# Patient Record
Sex: MALE | NOT HISPANIC OR LATINO | Employment: FULL TIME | URBAN - METROPOLITAN AREA
[De-identification: names, ages, dates, MRNs, and addresses within clinical notes are randomized per-mention and may not be internally consistent; named-entity substitution may affect disease eponyms.]

---

## 2023-06-05 ENCOUNTER — TRANSFERRED RECORDS (OUTPATIENT)
Dept: HEALTH INFORMATION MANAGEMENT | Facility: CLINIC | Age: 53
End: 2023-06-05

## 2023-06-05 ENCOUNTER — MEDICAL CORRESPONDENCE (OUTPATIENT)
Dept: HEALTH INFORMATION MANAGEMENT | Facility: CLINIC | Age: 53
End: 2023-06-05
Payer: COMMERCIAL

## 2023-06-05 LAB — HBA1C MFR BLD: 6.1 % (ref 4.8–5.6)

## 2023-06-20 NOTE — PROGRESS NOTES
Medication Therapy Management (MTM) Encounter    ASSESSMENT:                            Medication Adherence/Access: No issues identified    Type 2 Diabetes:    A1c at goal of less than 7.  Blood sugars at goal of less than 180.  Patient would like to pursue GLP-1 therapy due to comorbid overweight.  Appropriate use and potential side effects discussed.  We will start Mounjaro if covered by insurance and available via . Patient unsure whether or not he wants to start CGM, would like to prioritize weight loss, we will discuss in future visits.    Hypertension:   Stable.  Blood pressure goal less than 140/90    Hyperlipidemia:   Stable.    ED: Stable.    Other supplements:   Stable.    PLAN:                            1. I will attempt to send Mounjaro to [facility].     2. Let me know if you decide you'd rather do CGM monitoring.    Follow-up: Return in about 4 weeks (around 2023) for phone visit.    SUBJECTIVE/OBJECTIVE:                          Billy Chavez is a 53 year old male coming in for an initial visit. He was referred to me from Dr. Tong.    Reason for visit: Referred for GLP-1 discussion.    Allergies/ADRs: Reviewed in chart  Past Medical History: Reviewed in chart  Tobacco: He has no history on file for tobacco use.  Alcohol: not currently using    Medication Adherence/Access: no issues reported    Type 2 Diabetes:    Metformin XR 1500 mg daily    Patient is experiencing the following side effects: none  Aspirin: Not taking due to not indicated  Blood sugar monitorin-3 time(s) daily. Ranges (patient reported): Fasting- 120s  Post-Prandial- 100-140s  Current diabetes symptoms: none  Diet/Exercise: Feels hungry most of the time, like he's constantly fighting the feeling.  A1c last checked on 2023 was 6.1.    Hypertension:   Amlodipine 5 mg daily  Lisinopril/hydrochlorothiazide 20 mg / 12.5 mg daily    Patient does not self-monitor blood pressure.  Patient reports no current  medication side effects.  BP Readings from Last 3 Encounters:   06/21/23 126/84     Pulse Readings from Last 3 Encounters:   No data found for Pulse     Hyperlipidemia:   rosuvastatin 20mg daily    Patient reports no significant myalgias or other side effects.    ED: Occasional use of sildenafil 20 mg to 60 mg daily without complaint of issues.    Other supplements:   Multivitamin a few times a week  Vitamin D 2000 international units daily    No complaint of issues.  Patient likes to take a multivitamin for iodine supplementation.    Today's Vitals: /84   Wt 265 lb (120.2 kg)   ----------------    I spent 45 minutes with this patient today. All changes were made via collaborative practice agreement with Dr. Tong. A copy of the visit note was provided to the patient's provider(s).    A summary of these recommendations was given to the patient.    Shantell Thomas, CyrilD, BCACP  Medication Therapy Management Provider  Phone: [phone]  [email or web address]     Medication Therapy Recommendations  No medication therapy recommendations to display

## 2023-06-21 ENCOUNTER — OFFICE VISIT (OUTPATIENT)
Dept: PHARMACY | Facility: PHYSICIAN GROUP | Age: 53
End: 2023-06-21
Payer: COMMERCIAL

## 2023-06-21 VITALS — DIASTOLIC BLOOD PRESSURE: 84 MMHG | SYSTOLIC BLOOD PRESSURE: 126 MMHG | WEIGHT: 265 LBS

## 2023-06-21 DIAGNOSIS — E11.9 TYPE 2 DIABETES MELLITUS WITHOUT COMPLICATION, WITHOUT LONG-TERM CURRENT USE OF INSULIN (H): Primary | ICD-10-CM

## 2023-06-21 DIAGNOSIS — Z78.9 TAKES DIETARY SUPPLEMENTS: ICD-10-CM

## 2023-06-21 DIAGNOSIS — E78.2 MIXED HYPERLIPIDEMIA: ICD-10-CM

## 2023-06-21 DIAGNOSIS — N52.9 ERECTILE DYSFUNCTION, UNSPECIFIED ERECTILE DYSFUNCTION TYPE: ICD-10-CM

## 2023-06-21 DIAGNOSIS — I10 HTN (HYPERTENSION), BENIGN: ICD-10-CM

## 2023-06-21 PROCEDURE — 99605 MTMS BY PHARM NP 15 MIN: CPT | Performed by: PHARMACIST

## 2023-06-21 PROCEDURE — 99607 MTMS BY PHARM ADDL 15 MIN: CPT | Performed by: PHARMACIST

## 2023-06-21 RX ORDER — OMEGA-3 FATTY ACIDS/FISH OIL 300-1000MG
2000 CAPSULE ORAL DAILY
COMMUNITY
End: 2023-06-21

## 2023-06-21 RX ORDER — IBUPROFEN 200 MG
CAPSULE ORAL DAILY
COMMUNITY
End: 2023-06-21

## 2023-06-21 RX ORDER — METFORMIN HCL 500 MG
500 TABLET, EXTENDED RELEASE 24 HR ORAL
COMMUNITY

## 2023-06-21 RX ORDER — SILDENAFIL CITRATE 20 MG/1
20 TABLET ORAL 3 TIMES DAILY
COMMUNITY

## 2023-06-21 RX ORDER — ROSUVASTATIN CALCIUM 20 MG/1
20 TABLET, COATED ORAL DAILY
COMMUNITY

## 2023-06-21 RX ORDER — CHOLECALCIFEROL (VITAMIN D3) 50 MCG
1 TABLET ORAL DAILY
COMMUNITY

## 2023-06-21 RX ORDER — AMLODIPINE BESYLATE 5 MG/1
5 TABLET ORAL DAILY
COMMUNITY

## 2023-06-21 RX ORDER — TIRZEPATIDE 2.5 MG/.5ML
2.5 INJECTION, SOLUTION SUBCUTANEOUS
Qty: 2 ML | Refills: 0 | Status: CANCELLED | OUTPATIENT
Start: 2023-06-21

## 2023-06-21 NOTE — PATIENT INSTRUCTIONS
"Recommendations from today's MTM visit:                                                       1. I will attempt to send Mounjaro to [facility] [facility].     2. Let me know if you decide you'd rather do CGM monitoring.    Follow-up: Return in about 4 weeks (around 7/19/2023) for phone visit.    It was great speaking with you today.  I value your experience and would be very thankful for your time in providing feedback in our clinic survey. In the next few days, you may receive an email or text message from [employer] with a link to a survey related to your  clinical pharmacist.\"     To schedule another MTM appointment, please call the clinic directly or you may call the MTM scheduling line at [phone] or toll-free at [phone].     My Clinical Pharmacist's contact information:                                                      Please feel free to contact me with any questions or concerns you have.      Shantell Thomas, [place], [facility]  Medication Therapy Management Provider  Phone: [phone]  [email or web address]    "

## 2023-08-17 RX ORDER — TIRZEPATIDE 2.5 MG/.5ML
2.5 INJECTION, SOLUTION SUBCUTANEOUS
COMMUNITY
End: 2023-08-22 | Stop reason: DRUGHIGH

## 2023-08-17 NOTE — PROGRESS NOTES
Medication Therapy Management (MTM) Encounter    ASSESSMENT:                            Medication Adherence/Access: No issues identified    Type 2 Diabetes:   Patient is meeting A1c goal of < 7%. Self monitoring of blood glucose is at goal of fasting  mg/dL.  Patient is tolerating Mounjaro 2.5 mg weekly appropriately. Recommend increasing dose to help continue to control blood sugars and help with weight loss.    Hypertension:   Stable. Patient is meeting blood pressure goal of < 140/90mmHg.    Hyperlipidemia:   Stable.  Patient is on high intensity statin which is indicated based on 2019 ACC/AHA guidelines for lipid management.        PLAN:                            Increase Mounjaro to 5 mg a week      Follow-up: Return in about 6 weeks (around 10/3/2023) for Medication Therapy Management.    SUBJECTIVE/OBJECTIVE:                          Billy Chavez is a 53 year old male called for an initial visit. He was referred to me from Min Tong MD.      Reason for visit: med review, mounjaro follow up.    Allergies/ADRs: Reviewed in chart  Past Medical History: Reviewed in chart  Tobacco: He reports that he has quit smoking. His smoking use included cigarettes. He has never used smokeless tobacco.  Alcohol: not currently using      Medication Adherence/Access: no issues reported    Type 2 Diabetes:    Mounjaro 2.5 mg weekly  Metformin XR 1500 mg daily    Patient is not experiencing side effects. Reports he may have had some slight stomach upset but nothing to be concerned about.  Doesn't have a scale yet, so isn't sure about weight loss.  Does report a reduced appetite.  Blood sugar monitoring: fasting time(s) daily; Ranges: (patient reported) this morning was 94 mg/dL, it has been staying in the 90's.   Current diabetes symptoms: none  Diet/Exercise: Does report a reduced appetite.          Hypertension:   Amlodipine 5 mg daily  Lisinopril-hydrochlorothiazide 20-12.5 mg daily  Patient reports no current  medication side effects.  Patient does not self-monitor blood pressure.    BP Readings from Last 3 Encounters:   06/21/23 126/84       Hyperlipidemia:   rosuvastatin 20 mg daily  Patient reports no significant myalgias or other side effects.          Today's Vitals: There were no vitals taken for this visit.  ----------------      I spent 40 minutes with this patient today. All changes were made via collaborative practice agreement with Min Tong MD. A copy of the visit note was provided to the patient's provider(s).    A summary of these recommendations was declined by the patient.    Zoila Soares, PharmD  Medication Therapy Management Pharmacist  Voicemail: [phone]      Telemedicine Visit Details  Type of service:  Telephone visit  Start Time:  10:00 AM  End Time: 10:10 AM     Medication Therapy Recommendations  Type 2 diabetes mellitus without complication, without long-term current use of insulin (H)    Current Medication: tirzepatide (MOUNJARO) 2.5 MG/0.5ML pen (Discontinued)   Rationale: Dose too low - Dosage too low - Effectiveness   Recommendation: Increase Dose   Status: Accepted per CPA

## 2023-08-22 ENCOUNTER — VIRTUAL VISIT (OUTPATIENT)
Dept: PHARMACY | Facility: PHYSICIAN GROUP | Age: 53
End: 2023-08-22
Payer: COMMERCIAL

## 2023-08-22 DIAGNOSIS — E11.9 TYPE 2 DIABETES MELLITUS WITHOUT COMPLICATION, WITHOUT LONG-TERM CURRENT USE OF INSULIN (H): Primary | ICD-10-CM

## 2023-08-22 DIAGNOSIS — I10 HTN (HYPERTENSION), BENIGN: ICD-10-CM

## 2023-08-22 DIAGNOSIS — E78.2 MIXED HYPERLIPIDEMIA: ICD-10-CM

## 2023-08-22 PROCEDURE — 99606 MTMS BY PHARM EST 15 MIN: CPT | Performed by: PHARMACIST

## 2023-08-22 PROCEDURE — 99607 MTMS BY PHARM ADDL 15 MIN: CPT | Performed by: PHARMACIST

## 2023-08-22 RX ORDER — TIRZEPATIDE 5 MG/.5ML
5 INJECTION, SOLUTION SUBCUTANEOUS
COMMUNITY
End: 2023-10-03

## 2023-08-22 NOTE — PATIENT INSTRUCTIONS
"Recommendations from today's MTM visit:                                                       Increase Mounjaro to 5 mg a week    Follow-up: Return in about 6 weeks (around 10/3/2023) for Medication Therapy Management.    It was great speaking with you today.  I value your experience and would be very thankful for your time in providing feedback in our clinic survey. In the next few days, you may receive an email or text message from [facility] [employer] with a link to a survey related to your  clinical pharmacist.\"     To schedule another MTM appointment, please call the clinic directly or you may call the MTM scheduling line at [phone] or toll-free at [phone].     My Clinical Pharmacist's contact information:                                                      Please feel free to contact me with any questions or concerns you have.      Zoila Soares, PharmD  Medication Therapy Management Pharmacist  Voicemail: [phone]     "

## 2023-09-05 ENCOUNTER — HOSPITAL ENCOUNTER (OUTPATIENT)
Dept: NUTRITION | Facility: CLINIC | Age: 53
Discharge: HOME | End: 2023-09-05
Attending: FAMILY MEDICINE | Admitting: FAMILY MEDICINE
Payer: COMMERCIAL

## 2023-09-05 PROCEDURE — 97802 MEDICAL NUTRITION INDIV IN: CPT | Mod: 95 | Performed by: DIETITIAN, REGISTERED

## 2023-09-05 NOTE — PROGRESS NOTES
"[place] [facility]  Medical Nutrition Therapy    Visit Type: Initial Assessment    Billy Chavze, 53 year old referred by Min Tong for MNT related to Hyperlipidemia, Hypertension, Obesity, and Diabetes      Virtual Visit Details    Type of service:  Telephone Visit   Phone call duration: 65 minutes      Nutrition Assessment:  Anthropometrics  Height: 5' 10\" (patient report)        BMI:  38.4   Weight Status:  Obesity Grade II BMI 35-39.9   Weight:   Wt Readings from Last 1 Encounters:   06/21/23 120.2 kg (265 lb)       UBW: Not sure      IBW:  75 kg (166 lb) IBW %: 160%        Weight History:   Wt Readings from Last 10 Encounters:   06/21/23 120.2 kg (265 lb)   -Doesn't have a scale at home. Not sure what his weight is currently.   -Feels like weight has been slowly increasing over the years.     Goal Weight:   -Not specified    Nutrition History    PMH:   Diabetes Mellitus Type II  HTN  Hyperlipidemia    -Visited with a dietitian a few years ago, when he was dx with diabetes. Found these visits to be helpful. Information learned was mostly related to diabetes.   -Doesn't count carbs, but eats low carbs.   -BG has been around 100 mg/dL, in the PM BG is usually around 100-130 mg/dL.     Labs: reviewed  6/5/23: Hgb A1c: 6.1%      Meds: reviewed  Mounjaro 2.5 mg weekly  Metformin XR 1500 mg daily  Amlodipine 5 mg daily  Lisinopril-hydrochlorothiazide 20-12.5 mg daily  Rosuvastatin 20 mg daily     Supplements: reviewed      Social/Environmental:   -average sleep per night: not discussed  -level of stress: not discussed      Food Record  Breakfast: 9:00 am: Turkey breast, pepperoni, onion, cheese, cherry tomatoes, Chobani yogurt drink, 1% milk, coffee w/half and half, blueberries   Lunch: [place] restaurant - beef, veggies, white rice OR sweet kale salad, canned chicken breast, apple  Dinner: Ground bison, onions, cauliflower, avocado oil, sourdough bread OR salmon steak, 2 pieces of crusted tilapia, 1 " cup Nigerien mac n' cheese, 2 bowls of mediterranean crunch salad  Snacks: 2 small apples, stuffed olives with cheddar cheese, veggies with ranch, butterscotch candy OR Chobani yogurt drink   Beverages: Water, Coke Zero, sometimes instant coffee, trying to reduce caffeine   -Take-out - once per week - goes to all different restaurants - [employer] (1 big burriTangerine Power - the largest one they offer), OR a big burger from [employer], [employer] [place], [employer], [place] [employer]  -Works from home     Nutritional Details:   -Food allergies: none  -Food intolerances: none  -Food sensitivities: none  -GI concerns:  none  -Appetite: good  -Pace of eating: tries not to eat too fast, sometimes in a hurry, mostly moderate speed  -Role of cooking: self  -Role of food shopping: self      Physical Activity:  -Doesn't exercise. Stopped his club membership due to COVID.   -Has been biking and walking around the [address] [place] [address] for an hour.   -Weather is too hot for him.   -Bought a canoe rack outside with his son over the weekend.      ASSESSED MALNUTRITION STATUS  % Weight Loss:  None noted  % Intake:  No decreased intake noted  Subcutaneous Fat Loss:  Unable to determine  Loss of Muscle Mass:  Unable to determine  Fluid Retention:  None noted    Malnutrition Diagnosis:  Patient does not meet two of the above criteria necessary for diagnosing malnutrition      Nutrition Diagnosis:  Excessive energy intake related to food and nutrition knowledge deficit as evidenced by usual dietary intake exceeding energy needs by estimated 200%, BMI 38.4, dx diabetes mellitus type II, and report of not knowing nutrition recommendations for weight loss.         Nutrition Intervention:  Nutrition Prescription Summary: MNT for Hyperlipidemia, HTN, and obesity      Nutrition Education (Content):  -Educated pt on using MyPlate for meal planning and discussed portion sizes   -Discussed recommended and not recommended foods (choosing WGs, lean meats, low-fat dairy, fresh  fruits & veggies, unsat fats, and limiting high-sodium and refined sugar foods)  -Educated pt on metabolism and used the fire analogy; talked about the importance of consuming consistent meals/snacks throughout the day   -Educated patient on carb counting  -Discussed healthy snack options- protein+complex CHO  -Educated pt on reading food labels  -Discussed ways to make healthy choices when dining out (choosing baked, broiled, or grilled, unbreaded meats w/o sauces/gravies or choosing them on the side to control amount used)  -Encouraged pt to drink more water throughout the day and explained the importance of hydration.   -Encouraged pt to increase daily PA- include cardiovascular activities w/ultimate goal of 60 min per day     Emailed/mailed information discussed including nutrition handouts to patient.       Nutrition Prescription: Macronutrient and Micronutrient details   Dosing weight: Current wt (75 kg)  Energy: 4383-2795 kcals/day (Cecilia St Jeor)    Justification:  (obesity, to promote a 2 lb wt loss per week)   Protein: 75-90 g Pro/day (1-1.2 g pro/Kg)    Justification:  (obesity guidelines )   Fluid: 7893-5837 mL/day   (1 mL/Kcal)     Justification:  (obese)   Fiber:     Men (>50 years): 30 grams per day         Carbohydrate: 45-65% kcal   <25 g added sugar/day  2-3 CHO choices per meal and 1-2 CHO choices per snack        Fat: 20-35% kcal  <7% kcal from saturated fat   Micronutrient: DRI  <2,300 mg sodium/day            Vitamin and Mineral Supplements: n/a       Patient Engagement:   Assessed learning needs and learning preference: Yes.  Teaching Method(s) used: Explanation    Nutrition Education (Application):  a)  Discussed current eating plans / recommended alternative food choices    b)  Patient verbalizes understanding of diet by asking questions     Anticipate >50% compliance   Stage of Change:  preparation      Nutrition Goals:  1) Track calorie intake using My Fitness Pal and aim for 5014-9944  calories per day   2) Increase physical activity - aim for 30 min of exercise on weekdays and 60 min on weekends  3) Keep carbs at 30-45 g per meal and 15-30 g per snack     Nutrition Follow Up / Monitoring:   Weight, PO intake, PA, labs (A1c, BG, lipid panel)      Nutrition Recommendations:  Patient to follow-up with RD in 8 weeks.  Patient has RD contact information to call/email if needed.      Start time: 8:45  End time: 9:50    Total Time Duration: 65 min      Laurel Swain MS, RD, LD, [facility]  Clinical Dietitian  [phone]

## 2023-10-02 NOTE — PROGRESS NOTES
Medication Therapy Management (MTM) Encounter    ASSESSMENT:                            Medication Adherence/Access: No issues identified    Type 2 Diabetes:   Patient is meeting A1c goal of < 7%. Self monitoring of blood glucose is at goal of fasting  mg/dL.  Patient is tolerating Mounjaro 5 mg weekly appropriately. Recommend increasing dose to help continue to control blood sugars and help with weight loss.  Patient will reach out if he starts to have lows. Did discuss lows are rare on current regimen, but could reduce metformin dose if needed.     Hypertension:   Stable. Patient is meeting blood pressure goal of < 140/90mmHg.     Hyperlipidemia:   Stable.  Patient is on high intensity statin which is indicated based on 2019 ACC/AHA guidelines for lipid management.      PLAN:                            Increase Mounjaro to 7.5 mg a week    Follow-up: Return in about 3 weeks (around 10/24/2023) for Medication Therapy Management, In-Clinic Visit.    SUBJECTIVE/OBJECTIVE:                          Billy Chavez is a 53 year old male called for a follow-up visit from 8/22/23.       Reason for visit: Mounjaro follow up.    Allergies/ADRs: Reviewed in chart  Past Medical History: Reviewed in chart  Tobacco: He reports that he has quit smoking. His smoking use included cigarettes. He has never used smokeless tobacco.  Alcohol: not currently using    Medication Adherence/Access: no issues reported    Diabetes   Type 2 Diabetes:    Mounjaro 5 mg weekly  Metformin XR 1500 mg daily     Reports he is having less frequent bowel movements, but wouldn't call it constipation. Reports it is tolerable, just less frequent stool and larger amounts at once.  Doesn't have a scale yet, so isn't sure about weight loss. Would like to come in in person next visit to check weight. Interested in continuing to increase Mounjaro to help with additional weight loss.  Does report a reduced appetite. Reports that he is eating a bit  less.  Blood sugar monitoring: fasting time(s) daily; Ranges: (patient reported) last night was 111 mg/dL, 125 mg/dL yesterday morning. Reports most of them are in the lower 100's, but can dip into the low 90's or upper 80's. Nothing less than 80. Reports sometimes when he is in the low 80's he may get headaches or lethargy.  Current diabetes symptoms: none  Diet/Exercise: Patient reports that he has been exercising more lately.                 Hypertension   Amlodipine 5 mg daily  Lisinopril-hydrochlorothiazide 20-12.5 mg daily  Patient reports no current medication side effects.  Patient does not self-monitor blood pressure.          BP Readings from Last 3 Encounters:   06/21/23 126/84       Hyperlipidemia   rosuvastatin 20 mg daily  Patient reports no significant myalgias or other side effects.                   Today's Vitals: There were no vitals taken for this visit.  ----------------      I spent 20 minutes with this patient today. All changes were made via collaborative practice agreement with Min Tong MD. A copy of the visit note was provided to the patient's provider(s).    A summary of these recommendations was declined by the patient.    Zoila Soares, PharmD  Medication Therapy Management Pharmacist  Voicemail: [phone]      Telemedicine Visit Details  Type of service:  Telephone visit  Start Time:  10:00 AM  End Time:  10:20 AM       Medication Therapy Recommendations  Type 2 diabetes mellitus without complication, without long-term current use of insulin (H)    Current Medication: tirzepatide (MOUNJARO) 5 MG/0.5ML pen (Discontinued)   Rationale: Dose too low - Dosage too low - Effectiveness   Recommendation: Increase Dose - Mounjaro 7.5 MG/0.5ML Sopn   Status: Accepted per CPA

## 2023-10-03 ENCOUNTER — VIRTUAL VISIT (OUTPATIENT)
Dept: PHARMACY | Facility: PHYSICIAN GROUP | Age: 53
End: 2023-10-03
Payer: COMMERCIAL

## 2023-10-03 DIAGNOSIS — E11.9 TYPE 2 DIABETES MELLITUS WITHOUT COMPLICATION, WITHOUT LONG-TERM CURRENT USE OF INSULIN (H): Primary | ICD-10-CM

## 2023-10-03 DIAGNOSIS — I10 HTN (HYPERTENSION), BENIGN: ICD-10-CM

## 2023-10-03 DIAGNOSIS — E78.2 MIXED HYPERLIPIDEMIA: ICD-10-CM

## 2023-10-03 PROCEDURE — 99607 MTMS BY PHARM ADDL 15 MIN: CPT | Performed by: PHARMACIST

## 2023-10-03 PROCEDURE — 99606 MTMS BY PHARM EST 15 MIN: CPT | Performed by: PHARMACIST

## 2023-10-03 RX ORDER — TIRZEPATIDE 7.5 MG/.5ML
7.5 INJECTION, SOLUTION SUBCUTANEOUS
COMMUNITY
End: 2023-10-24

## 2023-10-03 NOTE — PATIENT INSTRUCTIONS
"Recommendations from today's MTM visit:                                                      Increase Mounjaro to 7.5 mg a week    Follow-up: Return in about 3 weeks (around 10/24/2023) for Medication Therapy Management, In-Clinic Visit.    It was great speaking with you today.  I value your experience and would be very thankful for your time in providing feedback in our clinic survey. In the next few days, you may receive an email or text message from [facility] [employer] with a link to a survey related to your  clinical pharmacist.\"     To schedule another MTM appointment, please call the clinic directly or you may call the MTM scheduling line at [phone] or toll-free at [phone].     My Clinical Pharmacist's contact information:                                                      Please feel free to contact me with any questions or concerns you have.      Zoila Soares, PharmD  Medication Therapy Management Pharmacist  Voicemail: [phone]    "

## 2023-10-17 ENCOUNTER — HOSPITAL ENCOUNTER (OUTPATIENT)
Dept: NUTRITION | Facility: CLINIC | Age: 53
Discharge: HOME | End: 2023-10-17
Attending: FAMILY MEDICINE | Admitting: FAMILY MEDICINE
Payer: COMMERCIAL

## 2023-10-17 PROCEDURE — 97803 MED NUTRITION INDIV SUBSEQ: CPT | Mod: TEL,95 | Performed by: DIETITIAN, REGISTERED

## 2023-10-17 NOTE — PROGRESS NOTES
"[facility]  Medical Nutrition Therapy     Visit Type: Re-Assessment     Billy Chavez, 53 year old referred by Min Tong for MNT related to Hyperlipidemia, Hypertension, Obesity, and Diabetes        Virtual Visit Details     Type of service:  Telephone Visit   Phone call duration: 30 minutes       Nutrition Assessment:  Anthropometrics  Height: 5' 10\" (patient report)          BMI:  38.4   Weight Status:  Obesity Grade II BMI 35-39.9   Weight:       Wt Readings from Last 1 Encounters:   23 120.2 kg (265 lb)        UBW: Not sure      IBW:  75 kg (166 lb) IBW %: 160%         Weight History:       Wt Readings from Last 10 Encounters:   23 120.2 kg (265 lb)   -Hasn't checked his weight lately. Has a scale that requires an jamie to use it. Having problems with his phone. He may return the scale.        Goal Weight:   -Not specified     Nutrition History     PMH:   Diabetes Mellitus Type II  HTN  Hyperlipidemia     Last visit w/patient was on 23. Nutrition goals set at that time were the followin) Track calorie intake using My Fitness Pal and aim for 3047-9991 calories per day   -Having problems with his phone. Hasn't started the jamie yet.   2) Increase physical activity - aim for 30 min of exercise on weekdays and 60 min on weekends  -Patient has been exercising more. Goal was 7 days per week but it's too much. Has been doing a lot of biking. Has been hurting non-stop. At least one day per week he's been resting. Close to 6 days per week exercising. Hasn't joined a gym yet so he's been outside exercising. Likes to row.   3) Keep carbs at 30-45 g per meal and 15-30 g per snack   -Has been eating more fish, crab, swordfish, and tilapia, following the Mediterranean Style Diet. Has been keeping high-carb foods to one serving. Using MyPlate.  -Doesn't count carbs, but eats low carbs.     -BG has been around 100 mg/dL, in the PM BG is usually around mid 80s -130 mg/dL. Rylands been " increased and BG has decreased slightly overall.   -Son is 15 years old and patient plans to get a gym membership for his son and him so they can workout together. He's interested in weight lifting.      Labs: reviewed  6/5/23: Hgb A1c: 6.1%    Meds: reviewed  Mounjaro 7.5 mg weekly  Metformin  mg daily  Amlodipine 5 mg daily  Lisinopril-hydrochlorothiazide 20-12.5 mg daily  Rosuvastatin 20 mg daily      Supplements: reviewed        Social/Environmental:   -average sleep per night: not discussed  -level of stress: not discussed        Food Record  Breakfast: 9:00 am: Less hungry with Mounjaro, and has been skipping breakfast a few times. Turkey breast, pepperoni, onion, cheese, cherry tomatoes, Chobani yogurt drink, 1% milk, coffee w/half and half, blueberries.   Lunch: [employer] restaurant - beef, veggies, white rice OR sweet kale salad, canned chicken breast, apple  Dinner: Ground bison, onions, cauliflower, avocado oil, sourdough bread OR salmon steak, 2 pieces of crusted tilapia, 1 cup Serbian mac n' cheese, 2 bowls of mediterranean crunch salad  Snacks: 2 small apples, stuffed olives with cheddar cheese, veggies with ranch, butterscotch candy OR Chobani yogurt drink   Beverages: Water, Coke Zero, sometimes instant coffee, trying to reduce caffeine   -Take-out - once per week - goes to all different restaurants - [employer] (1 big burrito - the largest one they offer), OR a big burger from [employer], [employer], [employer], [employer] food  -Works from home      Nutritional Details:   -Food allergies: none  -Food intolerances: none  -Food sensitivities: none  -GI concerns:  none  -Appetite: good  -Pace of eating: tries not to eat too fast, sometimes in a hurry, mostly moderate speed  -Role of cooking: self  -Role of food shopping: self        Physical Activity:  Patient has been exercising more. Goal was 7 days per week but it's too much. Has been doing a lot of biking. Has been hurting non-stop. At least  one day per week he's been resting. Close to 6 days per week exercising. Hasn't joined a gym yet so he's been outside exercising. Likes to row.      ASSESSED MALNUTRITION STATUS  % Weight Loss:  None noted  % Intake:  No decreased intake noted  Subcutaneous Fat Loss:  Unable to determine  Loss of Muscle Mass:  Unable to determine  Fluid Retention:  None noted     Malnutrition Diagnosis:  Patient does not meet two of the above criteria necessary for diagnosing malnutrition        Nutrition Diagnosis:  Excessive energy intake related to food and nutrition knowledge deficit as evidenced by usual dietary intake exceeding energy needs by estimated 200%, BMI 38.4, dx diabetes mellitus type II, and report of not knowing nutrition recommendations for weight loss.           Nutrition Intervention:  Nutrition Prescription Summary: MNT for Hyperlipidemia, HTN, and obesity       Nutrition Education (Content):  -Discussed goals set at last visit and barriers towards reaching them.   -Discussed exercise and healthy benefits. Educated patient on the benefits of aerobic and anaerobic exercises.   -Educated patient on concerns with intermittent fasting. Encouraged him to eat consistent meals throughout the day, listening to hunger/fullness cues.     Emailed/mailed information discussed including nutrition handouts to patient.         Nutrition Prescription: Macronutrient and Micronutrient details   Dosing weight: Current wt (75 kg)  Energy: 3028-3640 kcals/day ([place] St Jeor)     Justification:  (obesity, to promote a 2 lb wt loss per week)    Protein: 75-90 g Pro/day (1-1.2 g pro/Kg)     Justification:  (obesity guidelines )    Fluid: 3195-0735 mL/day   (1 mL/Kcal)     Justification:  (obese)   Fiber:     Men (>50 years): 30 grams per day          Carbohydrate: 45-65% kcal   <25 g added sugar/day  2-3 CHO choices per meal and 1-2 CHO choices per snack        Fat: 20-35% kcal  <7% kcal from saturated fat   Micronutrient:  DRI  <2,300 mg sodium/day                Vitamin and Mineral Supplements: n/a        Patient Engagement:   Assessed learning needs and learning preference: Yes.  Teaching Method(s) used: Explanation     Nutrition Education (Application):  a)  Discussed current eating plans / recommended alternative food choices     b)  Patient verbalizes understanding of diet by asking questions      Anticipate >50% compliance   Stage of Change:  preparation        Nutrition Goals:  1) Track calorie intake using [employer] Pal and aim for 9646-5257 calories per day. Try the website instead, if unable to use the jmaie.  2) Gradually increase physical activity - aim for 30 min of exercise on weekdays and 60 min on weekends. Get a gym membership for the winter.  3) Keep carbs consistent throughout the day. Use the [employer] Plate model for meal planning.      Nutrition Follow Up / Monitoring:   Weight, PO intake, PA, labs (A1c, BG, lipid panel)        Nutrition Recommendations:  Patient to follow-up with RD in 8 weeks.  Patient has RD contact information to call/email if needed.        Start time: 9:15  End time: 9:45     Total Time Duration: 30 min        Laurel Swain MS, RD, LD, [facility]  Clinical Dietitian  [phone]

## 2023-10-23 NOTE — PROGRESS NOTES
Medication Therapy Management (MTM) Encounter    ASSESSMENT:                            Medication Adherence/Access: No issues identified. Will send a message to Dr. Tong regarding request for sildenafil.    Type 2 Diabetes:   Patient is meeting A1c goal of < 7%. Self monitoring of blood glucose is at goal of fasting  mg/dL.  Patient is tolerating Mounjaro 7.5 mg weekly appropriately,so recommend increasing dose to help continue to control blood sugars and help with weight loss.  Discussed it sounds like he is having an injection site reaction, discussed with patient that this is common with injections and we don't have to stop the medication unless he wants to. Patient was injecting into the muscle of his thigh, so did discuss moving to the fattier part or injecting in the stomach again where he was only having occasional itchiness at injection site but no redness. Discussed he could take an antihistamine, but patient declined.  Patient will reach out if he starts to have lows. Did discuss lows are rare on current regimen, but could reduce metformin dose if needed.     Hypertension:   Stable. Patient is meeting blood pressure goal of < 140/90mmHg.     Hyperlipidemia:   Stable.  Patient is on high intensity statin which is indicated based on 2019 ACC/AHA guidelines for lipid management.      PLAN:                            Increase Mounjaro to 10 mg weekly.  Go back to injecting into the stomach or if you are injecting into your legs inject into the fattier part of your leg not the muscle      Follow-up: Return in about 4 weeks (around 11/21/2023) for Medication Therapy Management, Phone Visit.    SUBJECTIVE/OBJECTIVE:                          Billy Chavez is a 53 year old male coming in for a follow-up visit from 10/3/23.       Reason for visit: Mounjaro Follow up.    Allergies/ADRs: Reviewed in chart  Past Medical History: Reviewed in chart  Tobacco: He reports that he has quit smoking. His smoking use  included cigarettes. He has never used smokeless tobacco.  Alcohol: not currently using    Medication Adherence/Access: no issues reported  Patient requested a refill of sildenafil. This has never been prescribed by Dr. Tong, prescribed by previous physician at a different clinic.    Diabetes Type 2 Diabetes:    Mounjaro 7.5 mg weekly  Metformin XR 1500 mg daily     Reports today that is having an allergic reaction to the injection. Reports that he had only been injecting in stomach and recently switched to the leg. Reports since injecting into the leg has had a red raised bump that is itchy. Has been injecting into the top of the leg. Reports it does go away but it takes several days. Did not have any redness or raised skin when injecting into the stomach, but it was slightly itchy.  Also, noticed more heartburn lately. Didn't realize that could be related. Reports that he likes spicy foods and thinks it may be related to these.  Reports that he has noticed reduced appetite. Some times has no interest in eating more than one meal a day.  Reports he is having less frequent bowel movements, but wouldn't call it constipation. Reports it is tolerable, just less frequent stool and larger amounts at once.    Blood sugar monitoring: fasting time(s) daily; Ranges: (patient reported) Reports sometimes when he is in the low 80's he may get headaches or lethargy. Doesn't seem to happen too frequently, but maybe if he doesn't eat anything all day.  Current diabetes symptoms: none  Diet/Exercise: Patient reports that he has been exercising more lately.            Wt Readings from Last 5 Encounters:   10/24/23 256 lb 9 oz (116.4 kg)   06/21/23 265 lb (120.2 kg)          Hypertension   Amlodipine 5 mg daily  Lisinopril-hydrochlorothiazide 20-12.5 mg daily  Patient reports no current medication side effects. Occasional lightheadedness when moving from laying on the couch to standing. No concerns with falling and doesn't happen  very often.  Patient does not self-monitor blood pressure.          BP Readings from Last 3 Encounters:   10/24/23 128/80   06/21/23 126/84     Pulse Readings from Last 3 Encounters:   10/24/23 79     Hyperlipidemia   rosuvastatin 20 mg daily  Patient reports no significant myalgias or other side effects.                   Today's Vitals: /80   Pulse 79   Wt 256 lb 9 oz (116.4 kg)   ----------------      I spent 40 minutes with this patient today. All changes were made via collaborative practice agreement with Min Tong MD. A copy of the visit note was provided to the patient's provider(s).    A summary of these recommendations was given to the patient.    Zoila Soares, PharmD  Medication Therapy Management Pharmacist  Voicemail: [phone]       Medication Therapy Recommendations  Type 2 diabetes mellitus without complication, without long-term current use of insulin (H)    Current Medication: tirzepatide (MOUNJARO) 7.5 MG/0.5ML pen (Discontinued)   Rationale: Dose too low - Dosage too low - Effectiveness   Recommendation: Increase Dose   Status: Accepted per CPA          Current Medication: tirzepatide (MOUNJARO) 7.5 MG/0.5ML pen (Discontinued)   Rationale: Undesirable effect - Adverse medication event - Safety   Recommendation: Provide Education   Status: Patient Agreed - Adherence/Education

## 2023-10-24 ENCOUNTER — OFFICE VISIT (OUTPATIENT)
Dept: PHARMACY | Facility: PHYSICIAN GROUP | Age: 53
End: 2023-10-24
Payer: COMMERCIAL

## 2023-10-24 VITALS — DIASTOLIC BLOOD PRESSURE: 80 MMHG | WEIGHT: 256.56 LBS | HEART RATE: 79 BPM | SYSTOLIC BLOOD PRESSURE: 128 MMHG

## 2023-10-24 DIAGNOSIS — I10 HTN (HYPERTENSION), BENIGN: ICD-10-CM

## 2023-10-24 DIAGNOSIS — E78.2 MIXED HYPERLIPIDEMIA: ICD-10-CM

## 2023-10-24 DIAGNOSIS — E11.9 TYPE 2 DIABETES MELLITUS WITHOUT COMPLICATION, WITHOUT LONG-TERM CURRENT USE OF INSULIN (H): Primary | ICD-10-CM

## 2023-10-24 PROCEDURE — 99607 MTMS BY PHARM ADDL 15 MIN: CPT | Performed by: PHARMACIST

## 2023-10-24 PROCEDURE — 99606 MTMS BY PHARM EST 15 MIN: CPT | Performed by: PHARMACIST

## 2023-10-24 RX ORDER — TIRZEPATIDE 10 MG/.5ML
10 INJECTION, SOLUTION SUBCUTANEOUS
COMMUNITY
End: 2023-11-21

## 2023-10-24 NOTE — PATIENT INSTRUCTIONS
"Recommendations from today's MTM visit:                                                       Increase Mounjaro to 10 mg weekly.  Go back to injecting into the stomach or if you are injecting into your legs inject into the fattier part of your leg not the muscle  I will send a message to Dr. Tong regarding a refill of sildenafil    Follow-up: Return in about 4 weeks (around 11/21/2023) for Medication Therapy Management, Phone Visit.    It was great speaking with you today.  I value your experience and would be very thankful for your time in providing feedback in our clinic survey. In the next few days, you may receive an email or text message from [facility] with a link to a survey related to your  clinical pharmacist.\"     To schedule another MTM appointment, please call the clinic directly or you may call the MTM scheduling line at [phone] or toll-free at [phone].     My Clinical Pharmacist's contact information:                                                      Please feel free to contact me with any questions or concerns you have.      Zoila Soares, PharmD  Medication Therapy Management Pharmacist  Voicemail: [phone]    "

## 2023-11-21 ENCOUNTER — VIRTUAL VISIT (OUTPATIENT)
Dept: PHARMACY | Facility: PHYSICIAN GROUP | Age: 53
End: 2023-11-21
Payer: COMMERCIAL

## 2023-11-21 DIAGNOSIS — E11.9 TYPE 2 DIABETES MELLITUS WITHOUT COMPLICATION, WITHOUT LONG-TERM CURRENT USE OF INSULIN (H): Primary | ICD-10-CM

## 2023-11-21 DIAGNOSIS — E78.2 MIXED HYPERLIPIDEMIA: ICD-10-CM

## 2023-11-21 DIAGNOSIS — I10 HTN (HYPERTENSION), BENIGN: ICD-10-CM

## 2023-11-21 PROCEDURE — 99607 MTMS BY PHARM ADDL 15 MIN: CPT | Performed by: PHARMACIST

## 2023-11-21 PROCEDURE — 99606 MTMS BY PHARM EST 15 MIN: CPT | Performed by: PHARMACIST

## 2023-11-21 RX ORDER — TIRZEPATIDE 12.5 MG/.5ML
12.5 INJECTION, SOLUTION SUBCUTANEOUS
COMMUNITY
End: 2023-12-19

## 2023-11-21 NOTE — PATIENT INSTRUCTIONS
"Recommendations from today's MTM visit:                                                       Increase Mounjaro to 12.5 mg a week    Follow-up: Return in about 4 weeks (around 12/19/2023) for Medication Therapy Management, Phone Visit.    It was great speaking with you today.  I value your experience and would be very thankful for your time in providing feedback in our clinic survey. In the next few days, you may receive an email or text message from [facility] with a link to a survey related to your  clinical pharmacist.\"     To schedule another MTM appointment, please call the clinic directly or you may call the MTM scheduling line at [phone] or toll-free at [phone].     My Clinical Pharmacist's contact information:                                                      Please feel free to contact me with any questions or concerns you have.      Zoila Soares, PharmD  Medication Therapy Management Pharmacist  Voicemail: [phone]    "

## 2023-12-12 ENCOUNTER — HOSPITAL ENCOUNTER (OUTPATIENT)
Dept: NUTRITION | Facility: CLINIC | Age: 53
Discharge: HOME | End: 2023-12-12
Payer: COMMERCIAL

## 2023-12-12 PROCEDURE — 97803 MED NUTRITION INDIV SUBSEQ: CPT | Mod: TEL,95 | Performed by: DIETITIAN, REGISTERED

## 2023-12-12 NOTE — PROGRESS NOTES
"[facility]  Medical Nutrition Therapy     Visit Type: Re-Assessment     Billy Chavez, 53 year old referred by Min Tong for MNT related to Hyperlipidemia, Hypertension, Obesity, and Diabetes        Virtual Visit Details     Type of service:  Telephone Visit   Phone call duration: 42 minutes       Nutrition Assessment:  Anthropometrics  Height: 5' 10\" (patient report)          BMI:  36.8   Weight Status:  Obesity Grade II BMI 35-39.9   Weight:         Wt Readings from Last 1 Encounters:   10/24/23 116.4 kg (256 lb)        UBW: Not sure      IBW:  75 kg (166 lb) IBW %: 154%         Weight History:   Wt Readings from Last 10 Encounters:   10/24/23 116.4 kg (256 lb 9 oz)   23 120.2 kg (265 lb)     -Current wt is 253 lb.     Goal Weight:   -Not specified     Nutrition History     PMH:   Diabetes Mellitus Type II  HTN  Hyperlipidemia     Last visit w/patient was on 10/17/23. Nutrition goals set at that time were the followin) Track calorie intake using My [employer] Pal and aim for 5145-8718 calories per day. Try the website instead, if unable to use the jamie.  -Hasn't started tracking calories.   2) Gradually increase physical activity - aim for 30 min of exercise on weekdays and 60 min on weekends. Get a gym membership for the winter.  -Hasn't been consistently exercising. Has used the rower at home.   3) Keep carbs consistent throughout the day. Use the My Plate model for meal planning.   -Has been monitoring carb intake.     -Patient is down 12 lb.   -Patient bought a scale from [employer].   -Works from home. Work schedule includes 10 hr days. May be able to get in exercise in the morning.      Labs: reviewed  23: Hgb A1c: 6.1%  BG: has been trending lower which patient believes is due to the Mounjaro. BG has been at 84, 111, 103, 106, 90, 82, 84, 126      Meds: reviewed  Mounjaro 7.5 mg weekly  Metformin  mg daily  Amlodipine 5 mg daily  Lisinopril-hydrochlorothiazide 20-12.5 mg " daily  Rosuvastatin 20 mg daily      Supplements: reviewed        Social/Environmental:   -average sleep per night: not discussed  -level of stress: not discussed        Food Record  Breakfast: 9:00 am: Less hungry with Mounjaro, and has been skipping breakfast a few times. Smoked salmon 1 oz with cherry tomatoes, coffee, and milk.   Lunch: Salad with 1/2 can of chicken 6.25 oz (Mediterranean crunch or maple bourbon shen or buffalo ranch) eats half of the pack now.   Dinner: [place] steak, 2 pieces of crusted tilapia. Struggling to give up potatoes. Eating more wheat bread. Made some brown rice and beans.   Snacks: 2 small apples most days, stuffed olives with cheddar cheese, veggies with ranch, not buying butterscotch candy anymore (was buying these for hypoglycemia) OR Chobani yogurt drink, nuts, beef jerky stick   Beverages: Water, Coke Zero just once in a while (went from 3 days to 0 most days), sometimes instant coffee, trying to reduce caffeine   -Take-out - once per week - goes to a [employer] restaurant  -Works from home        Nutritional Details:   -Food allergies: none  -Food intolerances: none  -Food sensitivities: none  -GI concerns:  none  -Appetite: good  -Pace of eating: tries not to eat too fast, sometimes in a hurry, mostly moderate speed  -Role of cooking: self  -Role of food shopping: self        Physical Activity:   Joined [employer]. Finding that getting there is difficult. Has a rowing machine at home and did 30 min on it. Will be doing this 6 days per week and will work to increase time on it. Would like to get to the gym 3 days per week to lift - time constraints interfere though.     ASSESSED MALNUTRITION STATUS  % Weight Loss:  None noted  % Intake:  No decreased intake noted  Subcutaneous Fat Loss:  Unable to determine  Loss of Muscle Mass:  Unable to determine  Fluid Retention:  None noted     Malnutrition Diagnosis:  Patient does not meet two of the above criteria necessary for  diagnosing malnutrition        Nutrition Diagnosis:  Excessive energy intake related to food and nutrition knowledge deficit as evidenced by usual dietary intake exceeding energy needs by estimated 200%, BMI 38.4, dx diabetes mellitus type II, and report of not knowing nutrition recommendations for weight loss.           Nutrition Intervention:  Nutrition Prescription Summary: MNT for Hyperlipidemia, HTN, and obesity       Nutrition Education (Content):  -Discussed goals set at last visit and barriers towards reaching them.   -Educated patient on complete vs incomplete proteins, as he inquired about protein for muscle building. Encouraged him to consume some of both types of proteins, as the Mediterranean style diet includes plant-based proteins.   -Reviewed recent food choices.   -Commended patient for weight loss.      Emailed/mailed information discussed including nutrition handouts to patient.         Nutrition Prescription: Macronutrient and Micronutrient details   Dosing weight: Current wt (75 kg)  Energy: 5748-5245 kcals/day ([place] St Jeor)     Justification:  (obesity, to promote a 2 lb wt loss per week)    Protein: 75-90 g Pro/day (1-1.2 g pro/Kg)     Justification: (obesity guidelines)    Fluid: 3501-6329 mL/day   (1 mL/Kcal)     Justification:  (obese)   Fiber:     Men (>50 years): 30 grams per day          Carbohydrate: 45-65% kcal   <25 g added sugar/day  2-3 CHO choices per meal and 1-2 CHO choices per snack        Fat: 20-35% kcal  <7% kcal from saturated fat   Micronutrient: DRI  <2,300 mg sodium/day                Vitamin and Mineral Supplements: n/a        Patient Engagement:   Assessed learning needs and learning preference: Yes.  Teaching Method(s) used: Explanation     Nutrition Education (Application):  a)  Discussed current eating plans / recommended alternative food choices     b)  Patient verbalizes understanding of diet by asking questions      Anticipate >50% compliance   Stage of  Change:  preparation        Nutrition Goals:  1) Track calorie intake using My Fitness Pal and aim for 5692-2144 calories per day. Try the website instead, if unable to use the jamie.  2) Exercise 5 days of cardio (30 min each) and 60 min of cardio on the weekends       Nutrition Follow Up / Monitoring:   Weight, PO intake, PA, labs (A1c, BG, lipid panel)        Nutrition Recommendations:  Patient to follow-up with RD in 8 weeks.  Patient has RD contact information to call/email if needed.        Start time: 9:15  End time: 9:57     Total Time Duration: 42 min        Laurel Swain MS, RD, LD, [facility]  Clinical Dietitian  [phone]

## 2023-12-18 NOTE — PROGRESS NOTES
Medication Therapy Management (MTM) Encounter    ASSESSMENT:                            Medication Adherence/Access: No issues identified    Type 2 Diabetes:   Patient is meeting A1c goal of < 7%. Self monitoring of blood glucose is at goal of fasting  mg/dL.  Patient is tolerating Mounjaro 12.5 mg weekly appropriately, so recommend increasing dose to help continue to control blood sugars and help with weight loss. Discussed we would expect him to start having consistent weight loss at this dose, but also encouraged him to keep working with the nutritionist.  Discussed that the Freestyle Zak 2 or 3 would be the recommended CGM is he is concerned about potential cost. Reviewed the difference between these. Patient decided to think on it and will let me know if he would like to start one of these.     Hypertension:   Stable. Patient is meeting blood pressure goal of < 140/90mmHg.     Hyperlipidemia:   Stable.  Patient is on high intensity statin which is indicated based on 2019 ACC/AHA guidelines for lipid management.      PLAN:                            Increase Mounjaro to 15 mg a week  Let me know if you would like to start a Freestyle Zak 2 or Zak 3 device    Follow-up: Return in about 4 weeks (around 1/16/2024) for Medication Therapy Management, Phone Visit.    SUBJECTIVE/OBJECTIVE:                          Billy Chavez is a 53 year old male called for a follow-up visit from 11/21/23.       Reason for visit: mounjaro follow up.    Allergies/ADRs: Reviewed in chart  Past Medical History: Reviewed in chart  Tobacco: He reports that he has quit smoking. His smoking use included cigarettes. He has never used smokeless tobacco.  Alcohol: not currently using    Medication Adherence/Access: no issues reported. Patient reports he is changing insurance in the new year, and is a little concerned about what his prescription coverage will be.    Diabetes Type 2 Diabetes:    Mounjaro 12.5 mg weekly  Metformin  XR 1500 mg daily     Reports that he has felt less hungry, so he does feel like the higher dose has been having an effect. Has noticed some weight loss at the higher doses. Is interested in increasing the dose further.  Has also gotten a gym membership, but hasn't started going there regularly.  Is meeting with a nutritionist regularly to help with diet.    Blood sugar monitoring: fasting time(s) daily; Ranges: (patient reported) Reports readings are still good and within goal. Patient is interested in getting a CGM if it is affordable. Wondering what his options are.  Current diabetes symptoms: none  Diet/Exercise: Patient reports that he has been exercising more lately.            Wt Readings from Last 5 Encounters:   10/24/23 256 lb 9 oz (116.4 kg)   06/21/23 265 lb (120.2 kg)          Hypertension   Amlodipine 5 mg daily  Lisinopril-hydrochlorothiazide 20-12.5 mg daily  Patient reports no current medication side effects. Occasional lightheadedness when moving from laying on the couch to standing. No concerns with falling and doesn't happen very often.  Patient does not self-monitor blood pressure.          BP Readings from Last 3 Encounters:   10/24/23 128/80   06/21/23 126/84     Pulse Readings from Last 3 Encounters:   10/24/23 79     Hyperlipidemia   rosuvastatin 20 mg daily  Patient reports no significant myalgias or other side effects.               Today's Vitals: There were no vitals taken for this visit.  ----------------      I spent 21 minutes with this patient today. All changes were made via collaborative practice agreement with Min Tong MD. A copy of the visit note was provided to the patient's provider(s).    A summary of these recommendations was declined by the patient.    Zoila Soares, PharmD  Medication Therapy Management Pharmacist  Voicemail: [phone]      Telemedicine Visit Details  Type of service:  Telephone visit  Start Time:  8:32 AM  End Time: 8:53 AM     Medication  Therapy Recommendations  Type 2 diabetes mellitus without complication, without long-term current use of insulin (H)    Current Medication: tirzepatide (MOUNJARO) 12.5 MG/0.5ML pen (Discontinued)   Rationale: Dose too low - Dosage too low - Effectiveness   Recommendation: Increase Dose   Status: Accepted per CPA

## 2023-12-19 ENCOUNTER — VIRTUAL VISIT (OUTPATIENT)
Dept: PHARMACY | Facility: PHYSICIAN GROUP | Age: 53
End: 2023-12-19
Payer: COMMERCIAL

## 2023-12-19 DIAGNOSIS — I10 HTN (HYPERTENSION), BENIGN: ICD-10-CM

## 2023-12-19 DIAGNOSIS — E78.2 MIXED HYPERLIPIDEMIA: ICD-10-CM

## 2023-12-19 DIAGNOSIS — E11.9 TYPE 2 DIABETES MELLITUS WITHOUT COMPLICATION, WITHOUT LONG-TERM CURRENT USE OF INSULIN (H): Primary | ICD-10-CM

## 2023-12-19 PROCEDURE — 99607 MTMS BY PHARM ADDL 15 MIN: CPT | Performed by: PHARMACIST

## 2023-12-19 PROCEDURE — 99606 MTMS BY PHARM EST 15 MIN: CPT | Performed by: PHARMACIST

## 2023-12-19 RX ORDER — TIRZEPATIDE 15 MG/.5ML
15 INJECTION, SOLUTION SUBCUTANEOUS
COMMUNITY

## 2023-12-19 NOTE — PATIENT INSTRUCTIONS
"Recommendations from today's MTM visit:                                                       Increase Mounjaro to 15 mg a week  Let me know if you would like to start a Freestyle Zak 2 or Zak 3 device    Follow-up: Return in about 4 weeks (around 1/16/2024) for Medication Therapy Management, Phone Visit.    It was great speaking with you today.  I value your experience and would be very thankful for your time in providing feedback in our clinic survey. In the next few days, you may receive an email or text message from [employer] with a link to a survey related to your  clinical pharmacist.\"     To schedule another MTM appointment, please call the clinic directly or you may call the MTM scheduling line at [phone] or toll-free at [phone].     My Clinical Pharmacist's contact information:                                                      Please feel free to contact me with any questions or concerns you have.      Zoila Soares, PharmD  Medication Therapy Management Pharmacist  Voicemail: [phone]     "

## 2024-01-15 NOTE — PROGRESS NOTES
Medication Therapy Management (MTM) Encounter    ASSESSMENT:                            Medication Adherence/Access: No issues identified    Type 2 Diabetes:   Patient is meeting A1c goal of < 7%. Self monitoring of blood glucose is at goal of fasting  mg/dL.  No medication changes recommended today.  Discussed that the Freestyle Zak 2 or 3 would be the recommended CGM if he is concerned about potential cost. Reviewed the difference between these. Discussed the cheapest option is if he can use the jamie on his phone, so will wait to see if this a capability of his phone or not.     Hypertension:   Stable. Patient is meeting blood pressure goal of < 140/90mmHg.     Hyperlipidemia:   Stable.  Patient is on high intensity statin which is indicated based on 2019 ACC/AHA guidelines for lipid management.         PLAN:                            When you get your phone see if it has the capacity to download the Freestyle Zak 3 jamie.    Follow-up: Return in about 4 weeks (around 2/13/2024) for Medication Therapy Management, Phone Visit.    SUBJECTIVE/OBJECTIVE:                          Billy Chavez is a 53 year old male called for a follow-up visit from 12/19/23.       Reason for visit: diabetes follow up.    Allergies/ADRs: Reviewed in chart  Past Medical History: Reviewed in chart  Tobacco: He reports that he has quit smoking. His smoking use included cigarettes. He has never used smokeless tobacco.  Alcohol: not currently using    Medication Adherence/Access: no issues reported. Patient reports he is changing insurance in the new year, but Rx insurance is the same so thinks he should have same med coverage.    Diabetes Type 2 Diabetes:    Mounjaro 15 mg weekly - does have 3 months worth, got this before the end of the year  Metformin XR 1500 mg daily     Reports that he has felt less hungry, so he does feel like the higher dose has been having an effect. Has noticed some weight loss at the higher doses. No  other concerns with side effects.   Has also gotten a gym membership, but hasn't started going there regularly.  Is meeting with a nutritionist regularly to help with diet.  He has had a cold for the past couple weeks, but is mostly recovered now.    Blood sugar monitoring: fasting time(s) daily; Ranges: (patient reported) Reports readings are still good and within goal ( mg/dL), maybe one low in the past month. Patient is interested in getting a CGM if it is affordable - getting a new phone in a couple weeks. Isn't sure if this has the Dura[employer]weeper jamie.  Current diabetes symptoms: none  Diet/Exercise: Patient reports that he has been exercising more lately.            Wt Readings from Last 5 Encounters:   10/24/23 256 lb 9 oz (116.4 kg)   06/21/23 265 lb (120.2 kg)          Hypertension   Amlodipine 5 mg daily  Lisinopril-hydrochlorothiazide 20-12.5 mg daily  Patient reports no current medication side effects. Occasional lightheadedness when moving from laying on the couch to standing. No concerns with falling and doesn't happen very often.  Patient does not self-monitor blood pressure.        BP Readings from Last 3 Encounters:   10/24/23 128/80   06/21/23 126/84     Pulse Readings from Last 3 Encounters:   10/24/23 79     Hyperlipidemia   rosuvastatin 20 mg daily  Patient reports no significant myalgias or other side effects.                 Today's Vitals: There were no vitals taken for this visit.  ----------------      I spent 15 minutes with this patient today. All changes were made via collaborative practice agreement with Min Tong MD. A copy of the visit note was provided to the patient's provider(s).    A summary of these recommendations was declined by the patient.    Zoila Soares, PharmD  Medication Therapy Management Pharmacist  Voicemail: [phone]      Telemedicine Visit Details  Type of service:  Telephone visit  Start Time:  9:30 AM  End Time:  9:45 AM     Medication Therapy  Recommendations  No medication therapy recommendations to display

## 2024-01-16 ENCOUNTER — VIRTUAL VISIT (OUTPATIENT)
Dept: PHARMACY | Facility: PHYSICIAN GROUP | Age: 54
End: 2024-01-16
Payer: COMMERCIAL

## 2024-01-16 DIAGNOSIS — E11.9 TYPE 2 DIABETES MELLITUS WITHOUT COMPLICATION, WITHOUT LONG-TERM CURRENT USE OF INSULIN (H): Primary | ICD-10-CM

## 2024-01-16 DIAGNOSIS — I10 HTN (HYPERTENSION), BENIGN: ICD-10-CM

## 2024-01-16 DIAGNOSIS — E78.2 MIXED HYPERLIPIDEMIA: ICD-10-CM

## 2024-01-16 PROCEDURE — 99605 MTMS BY PHARM NP 15 MIN: CPT | Mod: 93 | Performed by: PHARMACIST

## 2024-01-16 NOTE — PATIENT INSTRUCTIONS
"Recommendations from today's MTM visit:                                                       When you get your phone see if it has the capacity to download the Freestyle Zak 3 jamie.    Follow-up: Return in about 4 weeks (around 2/13/2024) for Medication Therapy Management, Phone Visit.    It was great speaking with you today.  I value your experience and would be very thankful for your time in providing feedback in our clinic survey. In the next few days, you may receive an email or text message from [employer] with a link to a survey related to your  clinical pharmacist.\"     To schedule another MTM appointment, please call the clinic directly or you may call the MTM scheduling line at [phone] or toll-free at [phone].     My Clinical Pharmacist's contact information:                                                      Please feel free to contact me with any questions or concerns you have.      Zoila Soares, PharmD  Medication Therapy Management Pharmacist  Voicemail: [phone]     "

## 2024-02-05 ENCOUNTER — HOSPITAL ENCOUNTER (OUTPATIENT)
Dept: NUTRITION | Facility: HOSPITAL | Age: 54
Discharge: HOME | End: 2024-02-05
Admitting: FAMILY MEDICINE
Payer: COMMERCIAL

## 2024-02-05 PROCEDURE — 97803 MED NUTRITION INDIV SUBSEQ: CPT | Mod: TEL,95 | Performed by: DIETITIAN, REGISTERED

## 2024-02-05 NOTE — PROGRESS NOTES
"[facility]  Medical Nutrition Therapy     Visit Type: Re-Assessment     Billy Chavez, 53 year old referred by Min Tong for MNT related to Hyperlipidemia, Hypertension, Obesity, and Diabetes        Virtual Visit Details     Type of service:  Telephone Visit   Phone call duration: 30 minutes       Nutrition Assessment:  Anthropometrics  Height: 5' 10\" (patient report)          BMI:  33.9   Weight Status:  Obesity Grade I    Weight:         Wt Readings from Last 1 Encounters:   24 107 kg (235 lb)        UBW: Not sure      IBW:  75 kg (166 lb) IBW %: 154%         Weight History:       Wt Readings from Last 10 Encounters:   10/24/23 116.4 kg (256 lb 9 oz)   23 120.2 kg (265 lb)      -Dec. '23 wt. was 253 lb.   -Current wt is 235 lb. Wt loss of 18 lb over the past 8 weeks (wt loss of 2.25 lb per week). Wt loss of 30 lb over the past 8 months.         Goal Weight:   -Not specified     Nutrition History     PMH:   Diabetes Mellitus Type II  HTN  Hyperlipidemia     Last visit w/patient was on 23. Nutrition goals set at that time were the followin) Track calorie intake using My Fitness Pal and aim for 7380-9237 calories per day. Try the website instead, if unable to use the jamie.  -Got a new phone and got the [employer] jamie.   2) Exercise 5 days of cardio (30 min each) and 60 min of cardio on the weekends  -Has been doing some exercise - cardio and lifting on Saturday for 60 min. Has been exercising at lunch time (walking for 30 min or 20+ min on the rowing machine).        -Works from home. Work schedule includes 10 hr days. May be able to get in exercise in the morning.      Labs: reviewed  23: Hgb A1c: 6.1%  BG: has been trending lower which patient believes is due to the Mounjaro. BG has been at 84, 111, 103, 106, 90, 82, 84, 126      Meds: reviewed  Mounjaro 15 mg weekly (increased on 23)  Metformin  mg daily  Amlodipine 5 mg " daily  Lisinopril-hydrochlorothiazide 20-12.5 mg daily  Rosuvastatin 20 mg daily      Supplements: reviewed        Social/Environmental:   -average sleep per night: not discussed  -level of stress: not discussed        Food Record  Breakfast: 9:00 am: Less hungry with Mounjaro, and has been skipping breakfast a few times. Smoked salmon 0.5 oz (26 kcal) with 4 cherry tomatoes, coffee, and milk.   Lunch: 1/2 salad with 1/2 can of chicken 6.25 oz (Mediterranean crunch or maple bourbon shen or buffalo ranch) eats half of the pack now.   Dinner: Varies- small amount of brisket OR smoked salmon steak, 2 pieces of crusted tilapia, brown rice & quinoa OR can of beans, veggies.   Snacks: 2 small apples most days, stuffed olives with cheddar cheese, veggies with ranch, nuts, beef jerky stick   Beverages: Water, Coke Zero just once in a while (went from 3 days to 0 most days), sometimes instant coffee, trying to reduce caffeine   -Take-out - once per week - goes to a [employer] restaurant  -Works from home         Nutritional Details:   -Food allergies: none  -Food intolerances: none  -Food sensitivities: none  -GI concerns:  none  -Appetite: good  -Pace of eating: tries not to eat too fast, sometimes in a hurry, mostly moderate speed  -Role of cooking: self  -Role of food shopping: self        Physical Activity:  -Joined [employer]. Finding that getting there is difficult. Has a rowing machine at home and did 30 min on it.   [place]     ASSESSED MALNUTRITION STATUS  % Weight Loss:  None noted  % Intake:  No decreased intake noted  Subcutaneous Fat Loss:  Unable to determine  Loss of Muscle Mass:  Unable to determine  Fluid Retention:  None noted     Malnutrition Diagnosis:  Patient does not meet two of the above criteria necessary for diagnosing malnutrition        Nutrition Diagnosis:  Excessive energy intake related to food and nutrition knowledge deficit as evidenced by usual dietary  intake exceeding energy needs by estimated 200%, BMI 38.4, dx diabetes mellitus type II, and report of not knowing nutrition recommendations for weight loss.           Nutrition Intervention:  Nutrition Prescription Summary: MNT for Hyperlipidemia, HTN, and obesity       Nutrition Education (Content):  -Discussed goals set at last visit and barriers towards reaching them.   -Discussed protein goals and foods to include in his diet  -Encouraged small frequent meals  -Reviewed recent food choices.   -Commended patient for weight loss.   -Encouraged more physical activity.      Emailed/mailed information discussed including nutrition handouts to patient.         Nutrition Prescription: Macronutrient and Micronutrient details   Dosing weight: Current wt (115 kg) for energy and fluids, IBW (75 kg) for protein  Energy: 0921-8204 kcals/day ([place] Jeor)     Justification:  (obesity, to promote a 2 lb wt loss per week)    Protein: 75-90 g Pro/day (1-1.2 g pro/Kg)     Justification: (obesity guidelines)    Fluid: 4864-0375 mL/day   (1 mL/Kcal)     Justification:  (obese)   Fiber:     Men (>50 years): 30 grams per day          Carbohydrate: 45-65% kcal   <25 g added sugar/day  2-3 CHO choices per meal and 1-2 CHO choices per snack        Fat: 20-35% kcal  <7% kcal from saturated fat   Micronutrient: DRI  <2,300 mg sodium/day                Vitamin and Mineral Supplements: n/a        Patient Engagement:   Assessed learning needs and learning preference: Yes.  Teaching Method(s) used: Explanation     Nutrition Education (Application):  a)  Discussed current eating plans / recommended alternative food choices     b)  Patient verbalizes understanding of diet by asking questions      Anticipate >50% compliance   Stage of Change:  preparation        Nutrition Goals:  1) Track dietary intake using Cronometer and aim for 3164-2821 calories and 75-90 g protein per day.   2) Exercise 5 days days per week for 30-60  min      Nutrition Follow Up / Monitoring:   Weight, PO intake, PA, labs (A1c, BG, lipid panel)        Nutrition Recommendations:  Patient to follow-up with RD in 8 weeks.  Patient has RD contact information to call/email if needed.        Start time: 15:00  End time: 15:30     Total Time Duration: 30 min        Laurel Swain MS, RD, LD, CSOWM  Clinical Dietitian  [phone]

## 2024-02-12 NOTE — PROGRESS NOTES
Medication Therapy Management (MTM) Encounter    ASSESSMENT:                            Medication Adherence/Access: No issues identified    Type 2 Diabetes:   Patient is meeting A1c goal of < 7%. Self monitoring of blood glucose is at goal of fasting  mg/dL.  Recommended starting zak 3. Had patient confirm he was able to download the zak 3 jamie. Will plan next visit in person to either help apply sensor or review zak data.     Hypertension:   Stable. Patient is meeting blood pressure goal of < 140/90mmHg.     Hyperlipidemia:   Stable.  Patient is on high intensity statin which is indicated based on 2019 ACC/AHA guidelines for lipid management.      PLAN:                            Start using the Freestyle Zak 3 to check your blood sugars. We will plan your next visit in person in case you need assistance applying the sensor.    Follow-up: Return in about 4 weeks (around 3/12/2024) for Medication Therapy Management, In-Clinic Visit.    SUBJECTIVE/OBJECTIVE:                          Billy Chavez is a 53 year old male called for a follow-up visit from 1/16/24.       Reason for visit: diabetes.    Allergies/ADRs: Reviewed in chart  Past Medical History: Reviewed in chart  Tobacco: He reports that he has quit smoking. His smoking use included cigarettes. He has never used smokeless tobacco.  Alcohol: not currently using    Medication Adherence/Access: no issues reported    Diabetes Type 2 Diabetes:    Mounjaro 15 mg weekly - does have 3 months worth, got this before the end of the year  Metformin XR 1500 mg daily  Reports that he has felt less hungry, so he does feel like the higher dose has been having an effect. Has noticed some weight loss at the higher doses. No other concerns with side effects.   Has also gotten a gym membership, but hasn't started going there regularly.  Is meeting with a nutritionist regularly to help with diet.  Last home weight was 235 lbs.  Blood sugar monitoring: fasting  time(s) daily; Ranges: (patient reported) Reports readings are still good and within goal 80-90 mg/dL, only a couple above 100 mg/dL, maybe 1-2 under 80 mg/dL in the past month. Patient is interested in getting a CGM, has a new smart phone to use this with.  Current diabetes symptoms: none  Diet/Exercise: Patient reports that he has been exercising more lately.            Wt Readings from Last 5 Encounters:   10/24/23 256 lb 9 oz (116.4 kg)   06/21/23 265 lb (120.2 kg)            Hypertension   Amlodipine 5 mg daily  Lisinopril-hydrochlorothiazide 20-12.5 mg daily  Patient reports no current medication side effects. Occasional lightheadedness when moving from laying on the couch to standing. No concerns with falling and doesn't happen very often.  Patient does not self-monitor blood pressure.        BP Readings from Last 3 Encounters:   10/24/23 128/80   06/21/23 126/84     Pulse Readings from Last 3 Encounters:   10/24/23 79     Hyperlipidemia   rosuvastatin 20 mg daily  Patient reports no significant myalgias or other side effects.                   Today's Vitals: There were no vitals taken for this visit.  ----------------      I spent 13 minutes with this patient today. All changes were made via collaborative practice agreement with Min Tong MD. A copy of the visit note was provided to the patient's provider(s).    A summary of these recommendations was declined by the patient.    Zoila Soares, PharmD  Medication Therapy Management Pharmacist  Voicemail: [phone]      Telemedicine Visit Details  Type of service:  Telephone visit  Start Time:  9:30 AM  End Time: 9:43 AM     Medication Therapy Recommendations  Type 2 diabetes mellitus without complication, without long-term current use of insulin (H)    Current Medication: tirzepatide (MOUNJARO) 15 MG/0.5ML pen   Rationale: Medication requires monitoring - Needs additional monitoring   Recommendation: Self-Monitoring   Status: Accepted per CPA

## 2024-02-13 ENCOUNTER — VIRTUAL VISIT (OUTPATIENT)
Dept: PHARMACY | Facility: PHYSICIAN GROUP | Age: 54
End: 2024-02-13
Payer: COMMERCIAL

## 2024-02-13 DIAGNOSIS — E78.2 MIXED HYPERLIPIDEMIA: ICD-10-CM

## 2024-02-13 DIAGNOSIS — E11.9 TYPE 2 DIABETES MELLITUS WITHOUT COMPLICATION, WITHOUT LONG-TERM CURRENT USE OF INSULIN (H): Primary | ICD-10-CM

## 2024-02-13 DIAGNOSIS — I10 HTN (HYPERTENSION), BENIGN: ICD-10-CM

## 2024-02-13 PROCEDURE — 99606 MTMS BY PHARM EST 15 MIN: CPT | Mod: 93 | Performed by: PHARMACIST

## 2024-02-13 NOTE — PATIENT INSTRUCTIONS
"Recommendations from today's MTM visit:                                                       Start using the Freestyle Zak 3 to check your blood sugars. We will plan your next visit in person in case you need assistance applying the sensor.    Follow-up: Return in about 4 weeks (around 3/12/2024) for Medication Therapy Management, In-Clinic Visit.    It was great speaking with you today.  I value your experience and would be very thankful for your time in providing feedback in our clinic survey. In the next few days, you may receive an email or text message from [employer] with a link to a survey related to your  clinical pharmacist.\"     To schedule another MTM appointment, please call the clinic directly or you may call the MTM scheduling line at [phone].    My Clinical Pharmacist's contact information:                                                      Please feel free to contact me with any questions or concerns you have.      Zoila Soares, PharmD  Medication Therapy Management Pharmacist  Voicemail: [phone]     "

## 2024-03-11 NOTE — PROGRESS NOTES
Medication Therapy Management (MTM) Encounter    ASSESSMENT:                            Medication Adherence/Access: No issues identified    Type 2 Diabetes:   Patient is meeting A1c goal of < 7%. Self monitoring of blood glucose is at goal of >70% in range.  Discussed that Zak 3 is about 5 min behind fingerstick, so that could account for the difference. Also discussed first couple hours could be a little more off. Also, discussed that if it is within 20 units it is considered accurate. Also had patient compare fingerstick in clinic to Zak 3, and fingerstick was 128 mg/dL while libre3 was reading 127 mg/dL, so seemed to be working correctly now.  Refilled Mounjaro for patient.     Hypertension:   Stable. Patient is meeting blood pressure goal of < 140/90mmHg.     Hyperlipidemia:   Stable.  Patient is on high intensity statin which is indicated based on 2019 ACC/AHA guidelines for lipid management.      PLAN:                            Continue current medications unchanged. Let me know if you have anymore questions around your zak device.  I will send the Mounjaro for a 3 month supply at a time      Follow-up: Return in about 5 weeks (around 4/16/2024) for Medication Therapy Management, Phone Visit at 8:30 AM.    SUBJECTIVE/OBJECTIVE:                          Billy Chavez is a 53 year old male coming in for a follow-up visit from 2/13/24.       Reason for visit: diabetes.    Allergies/ADRs: Reviewed in chart  Past Medical History: Reviewed in chart  Tobacco: He reports that he has quit smoking. His smoking use included cigarettes. He has never used smokeless tobacco.  Alcohol: not currently using    Medication Adherence/Access: no issues reported    Diabetes   Type 2 Diabetes:    Mounjaro 15 mg weekly - Saturday  Metformin XR 1500 mg daily  Reports that he has felt less hungry, so he does feel like the higher dose has been having an effect. Has noticed some weight loss at the higher doses. No other  concerns with side effects.   Is meeting with a nutritionist regularly to help with diet.  Last home weight was 235 lbs.  Blood sugar monitoring: CGM; Ranges: (patient reported) Reports he has some concerns that aston doesn't match fingersticks. Brought meter in today to compare in clinic. CGM was reading low, but fingersticks showed that they weren't really low.  Current diabetes symptoms: none  Diet/Exercise: Patient reports that he has been exercising more lately.              Wt Readings from Last 5 Encounters:   03/12/24 236 lb (107 kg)   10/24/23 256 lb 9 oz (116.4 kg)   06/21/23 265 lb (120.2 kg)            Hypertension   Amlodipine 5 mg daily  Lisinopril-hydrochlorothiazide 20-12.5 mg daily  Patient reports no current medication side effects. Occasional lightheadedness when moving from laying on the couch to standing. No concerns with falling and doesn't happen very often.  Patient does not self-monitor blood pressure.        BP Readings from Last 3 Encounters:   03/12/24 130/82   10/24/23 128/80   06/21/23 126/84     Pulse Readings from Last 3 Encounters:   03/12/24 102   10/24/23 79       Hyperlipidemia   rosuvastatin 20 mg daily  Patient reports no significant myalgias or other side effects.              Today's Vitals: /82   Pulse 102   Wt 236 lb (107 kg)   ----------------      I spent 45 minutes with this patient today. All changes were made via collaborative practice agreement with Min Tong MD. A copy of the visit note was provided to the patient's provider(s).    A summary of these recommendations was given to the patient.    Zoila Soares, PharmD  Medication Therapy Management Pharmacist  Voicemail: [phone]           Medication Therapy Recommendations  No medication therapy recommendations to display

## 2024-03-12 ENCOUNTER — OFFICE VISIT (OUTPATIENT)
Dept: PHARMACY | Facility: PHYSICIAN GROUP | Age: 54
End: 2024-03-12
Payer: COMMERCIAL

## 2024-03-12 VITALS — WEIGHT: 236 LBS | SYSTOLIC BLOOD PRESSURE: 130 MMHG | DIASTOLIC BLOOD PRESSURE: 82 MMHG | HEART RATE: 102 BPM

## 2024-03-12 DIAGNOSIS — E78.2 MIXED HYPERLIPIDEMIA: ICD-10-CM

## 2024-03-12 DIAGNOSIS — E11.9 TYPE 2 DIABETES MELLITUS WITHOUT COMPLICATION, WITHOUT LONG-TERM CURRENT USE OF INSULIN (H): Primary | ICD-10-CM

## 2024-03-12 DIAGNOSIS — I10 HTN (HYPERTENSION), BENIGN: ICD-10-CM

## 2024-03-12 PROCEDURE — 99607 MTMS BY PHARM ADDL 15 MIN: CPT | Performed by: PHARMACIST

## 2024-03-12 PROCEDURE — 99606 MTMS BY PHARM EST 15 MIN: CPT | Performed by: PHARMACIST

## 2024-03-12 NOTE — PATIENT INSTRUCTIONS
"Recommendations from today's MTM visit:                                                       Continue current medications unchanged. Let me know if you have anymore questions around your aston device.  I will send the Mounjaro for a 3 month supply at a time    Follow-up: Return in about 5 weeks (around 4/16/2024) for Medication Therapy Management, Phone Visit at 8:30 AM.    It was great speaking with you today.  I value your experience and would be very thankful for your time in providing feedback in our clinic survey. In the next few days, you may receive an email or text message from [employer] with a link to a survey related to your  clinical pharmacist.\"     To schedule another MTM appointment, please call the clinic directly or you may call the MTM scheduling line at [phone].    My Clinical Pharmacist's contact information:                                                      Please feel free to contact me with any questions or concerns you have.      Zoila Soares, PharmD  Medication Therapy Management Pharmacist  Voicemail: [phone]     "

## 2024-04-01 ENCOUNTER — HOSPITAL ENCOUNTER (OUTPATIENT)
Dept: NUTRITION | Facility: HOSPITAL | Age: 54
Discharge: HOME | End: 2024-04-01
Attending: FAMILY MEDICINE | Admitting: FAMILY MEDICINE
Payer: COMMERCIAL

## 2024-04-01 PROCEDURE — 97803 MED NUTRITION INDIV SUBSEQ: CPT | Mod: TEL,95 | Performed by: DIETITIAN, REGISTERED

## 2024-04-01 NOTE — PROGRESS NOTES
"[facility]  Medical Nutrition Therapy     Visit Type: Re-Assessment     Billy Chavez, 53 year old referred by Min Tong for MNT related to Hyperlipidemia, Hypertension, Obesity, and Diabetes        Virtual Visit Details     Type of service:  Telephone Visit   Phone call duration: 31 minutes       Nutrition Assessment:  Anthropometrics  Height: 5' 10\" (patient report)          BMI:  33.9   Weight Status:  Obesity Grade I    Weight:         Wt Readings from Last 1 Encounters:   24 107 kg (235 lb)        UBW: Not sure      IBW:  75 kg (166 lb) IBW %: 154%         Weight History:         Wt Readings from Last 10 Encounters:   10/24/23 116.4 kg (256 lb 9 oz)   23 120.2 kg (265 lb)      -Dec. '23 wt. was 253 lb.   - wt was 235 lb.   -Hasn't checked his weight lately but feels that he's maintaining.         Goal Weight:   -Not specified     Nutrition History     PMH:   Diabetes Mellitus Type II  HTN  Hyperlipidemia     Last visit w/patient was on 24. Nutrition goals set at that time were the followin) Track dietary intake using Cronometer and aim for 8555-1855 calories and 75-90 g protein per day.   -Patient has been tracking his dietary intake.   2) Exercise 5 days days per week for 30-60 min   -Patient has not been exercising regularly. He did a 5 mile hike and walked often while on vacation..     -Works from home. Work schedule includes 10 hr days.   -Overall, things have been going well. He hasn't weighed himself lately.   -Pharmacy doesn't have Mounjaro at the moment so he hasn't had a dose since a week. He was on a vacation just prior to that in [place] for a week.   -Used the Freestyle Zak CGM during the trip. Hit some low levels during the trip (54, 71, 74, 69, 73). Didn't have many complex carbs available.      Labs: reviewed  23: Hgb A1c: 6.1%       Meds: reviewed  Mounjaro 15 mg weekly (increased on 23)  Metformin  mg daily  Amlodipine 5 mg " daily  Lisinopril-hydrochlorothiazide 20-12.5 mg daily  Rosuvastatin 20 mg daily      Supplements: reviewed        Social/Environmental:   -average sleep per night: not discussed  -level of stress: not discussed        Food Record  Breakfast: 9:00 am: Less hungry with Mounjaro, and has been skipping breakfast a few times. Smoked salmon 0.5 oz (26 kcal) with 4 cherry tomatoes, coffee, and milk.   Lunch: 1/2 salad with 1/2 can of chicken 6.25 oz (Mediterranean crunch or maple bourbon shen or buffalo ranch) eats half of the pack now.   Dinner: Varies- small amount of brisket OR smoked salmon steak, 2 pieces of crusted tilapia, brown rice & quinoa OR can of beans, veggies.   Snacks: 2 small apples most days, stuffed olives with cheddar cheese, veggies with ranch, nuts, beef jerky stick   Beverages: Water, Coke Zero just once in a while (went from 3 days to 0 most days), sometimes instant coffee, trying to reduce caffeine   -Take-out - once per week - goes to a [employer] restaurant  -Works from home   -While in [place] ate quite a bit of fish and seafood, poke bowl         Nutritional Details:   -Food allergies: none  -Food intolerances: none  -Food sensitivities: none  -GI concerns:  none  -Appetite: good  -Pace of eating: tries not to eat too fast, sometimes in a hurry, mostly moderate speed  -Role of cooking: self  -Role of food shopping: self        Physical Activity:  -Joined [employer]. Finding that getting there is difficult. Has a rowing machine at home and did 30 min on it.   -[place]     ASSESSED MALNUTRITION STATUS  % Weight Loss:  None noted  % Intake:  No decreased intake noted  Subcutaneous Fat Loss:  Unable to determine  Loss of Muscle Mass:  Unable to determine  Fluid Retention:  None noted     Malnutrition Diagnosis:  Patient does not meet two of the above criteria necessary for diagnosing malnutrition        Nutrition Diagnosis:  Excessive energy intake related to  food and nutrition knowledge deficit as evidenced by usual dietary intake exceeding energy needs by estimated 200%, BMI 38.4, dx diabetes mellitus type II, and report of not knowing nutrition recommendations for weight loss.           Nutrition Intervention:  Nutrition Prescription Summary: MNT for Hyperlipidemia, HTN, and obesity       Nutrition Education (Content):  -Discussed goals set at last visit and barriers towards reaching them.   -Discussed healthy snack options to include a complex carb and protein   -Educated patient on ways to keep BG consistent throughout the day and to prevent hypoglycemia overnight and in the morning.   -Discussed how to eat healthy and to follow carb control while traveling.   -Encouraged more physical activity.      Emailed/mailed information discussed including nutrition handouts to patient.         Nutrition Prescription: Macronutrient and Micronutrient details   Dosing weight: Current wt (115 kg) for energy and fluids, IBW (75 kg) for protein  Energy: 4434-9658 kcals/day ([place] [address] Jeor)     Justification:  (obesity, to promote a 2 lb wt loss per week)    Protein: 75-90 g Pro/day (1-1.2 g pro/Kg)     Justification: (obesity guidelines)    Fluid: 1254-2916 mL/day   (1 mL/Kcal)     Justification:  (obese)   Fiber:     Men (>50 years): 30 grams per day          Carbohydrate: 45-65% kcal   <25 g added sugar/day  2-3 CHO choices per meal and 1-2 CHO choices per snack        Fat: 20-35% kcal  <7% kcal from saturated fat   Micronutrient: DRI  <2,300 mg sodium/day                Vitamin and Mineral Supplements: n/a        Patient Engagement:   Assessed learning needs and learning preference: Yes.  Teaching Method(s) used: Explanation     Nutrition Education (Application):  a)  Discussed current eating plans / recommended alternative food choices     b)  Patient verbalizes understanding of diet by asking questions      Anticipate >50% compliance   Stage of  Change:  preparation        Nutrition Goals:  1) Track dietary intake using Cronometer and aim for 1606-9924 calories and 75-90 g protein per day.   2) Exercise 5 days days per week for 30-60 min   3) Make snacks a combination of a protein and complex carb and always consume a bedtime snack.      Nutrition Follow Up / Monitoring:   Weight, PO intake, PA, labs (A1c, BG, lipid panel)        Nutrition Recommendations:  Patient to follow-up with RD in 8 weeks.  Patient has RD contact information to call/email if needed.        Start time: 15:00  End time: 15:31     Total Time Duration: 31 min        Laurel Swain MS, RD, LD, CS[facility]  Clinical Dietitian  [phone]

## 2024-04-15 NOTE — PROGRESS NOTES
Medication Therapy Management (MTM) Encounter    ASSESSMENT:                            Medication Adherence/Access: See below for considerations    Type 2 Diabetes:   Patient is meeting A1c goal of < 7%. Self monitoring of blood glucose is at goal of >70% in range.  Discussed that there is a shortage of the higher Mounjaro doses. There isn't any medication right now equivalent to the higher doses, so he can either call around and try to find the 15 mg dose or he can see if his pharmacy has a lower dose like 10 mg people have been able to get and we can send that. Patient will call me back and let me know what he needs sent where.  Discussed that overall fingersticks and CGM have been matching, so no need to check both, unless he is having a low blood sugar.     Hypertension:   Stable. Patient is meeting blood pressure goal of < 140/90mmHg.     Hyperlipidemia:   Stable.  Patient is on high intensity statin which is indicated based on 2019 ACC/AHA guidelines for lipid management.      PLAN:                            Let me know [facility] I am happy to send a dose to whichever pharmacy you prefer.    Follow-up: Return in about 6 weeks (around 5/28/2024) for Medication Therapy Management, Phone Visit.    SUBJECTIVE/OBJECTIVE:                          Billy Chavez is a 53 year old male called for a follow-up visit.       Reason for visit: diabetes.    Allergies/ADRs: Reviewed in chart  Past Medical History: Reviewed in chart  Tobacco: He reports that he has quit smoking. His smoking use included cigarettes. He has never used smokeless tobacco.  Alcohol: not currently using    Medication Adherence/Access: no issues reported    Diabetes   Type 2 Diabetes:    Mounjaro 15 mg weekly - Reports he is out of this completely, hasn't been able to get for about 2 weeks  Metformin XR 1500 mg daily  Reports that he has felt less hungry, so he does feel like the higher dose has been  having an effect. Has noticed some weight loss at the higher doses. No other concerns with side effects.   Is meeting with a nutritionist regularly to help with diet.  Blood sugar monitoring: CGM; Ranges: (patient reported) Reports he has some concerns that aston doesn't match fingersticks. Reports that they are either the same or within 20 points of each other.  Current diabetes symptoms: none  Diet/Exercise: Patient reports that he has been exercising more lately.              Wt Readings from Last 5 Encounters:   03/12/24 236 lb (107 kg)   10/24/23 256 lb 9 oz (116.4 kg)   06/21/23 265 lb (120.2 kg)            Hypertension   Amlodipine 5 mg daily  Lisinopril-hydrochlorothiazide 20-12.5 mg daily  Patient reports no current medication side effects. Occasional lightheadedness when moving from laying on the couch to standing. No concerns with falling and doesn't happen very often.  Patient does not self-monitor blood pressure.        BP Readings from Last 3 Encounters:   03/12/24 130/82   10/24/23 128/80   06/21/23 126/84     Pulse Readings from Last 3 Encounters:   03/12/24 102   10/24/23 79       Hyperlipidemia   rosuvastatin 20 mg daily  Patient reports no significant myalgias or other side effects.                Today's Vitals: There were no vitals taken for this visit.  ----------------      I spent 27 minutes with this patient today. All changes were made via collaborative practice agreement with Min Tong MD. A copy of the visit note was provided to the patient's provider(s).    A summary of these recommendations was declined by the patient.    Zoila Soares, PharmD  Medication Therapy Management Pharmacist  Voicemail: [phone]      Telemedicine Visit Details  Type of service:  Telephone visit  Start Time:  8:30 AM  End Time:  8:57 AM     Medication Therapy Recommendations  No medication therapy recommendations to display

## 2024-04-16 ENCOUNTER — VIRTUAL VISIT (OUTPATIENT)
Dept: PHARMACY | Facility: PHYSICIAN GROUP | Age: 54
End: 2024-04-16
Payer: COMMERCIAL

## 2024-04-16 DIAGNOSIS — E78.2 MIXED HYPERLIPIDEMIA: ICD-10-CM

## 2024-04-16 DIAGNOSIS — I10 HTN (HYPERTENSION), BENIGN: ICD-10-CM

## 2024-04-16 DIAGNOSIS — E11.9 TYPE 2 DIABETES MELLITUS WITHOUT COMPLICATION, WITHOUT LONG-TERM CURRENT USE OF INSULIN (H): Primary | ICD-10-CM

## 2024-04-16 PROCEDURE — 99606 MTMS BY PHARM EST 15 MIN: CPT | Mod: 93 | Performed by: PHARMACIST

## 2024-04-16 PROCEDURE — 99607 MTMS BY PHARM ADDL 15 MIN: CPT | Mod: 93 | Performed by: PHARMACIST

## 2024-05-13 ENCOUNTER — HOSPITAL ENCOUNTER (OUTPATIENT)
Dept: NUTRITION | Facility: HOSPITAL | Age: 54
Discharge: HOME | End: 2024-05-13
Attending: FAMILY MEDICINE | Admitting: FAMILY MEDICINE
Payer: COMMERCIAL

## 2024-05-13 PROCEDURE — 97803 MED NUTRITION INDIV SUBSEQ: CPT | Mod: TEL,95 | Performed by: DIETITIAN, REGISTERED

## 2024-05-13 NOTE — PROGRESS NOTES
"[facility]  Medical Nutrition Therapy     Visit Type: Re-Assessment     Billy Chavez, 53 year old referred by Min Tong for MNT related to Hyperlipidemia, Hypertension, Obesity, and Diabetes        Virtual Visit Details     Type of service:  Telephone Visit   Phone call duration: 30 minutes       Nutrition Assessment:  Anthropometrics  Height: 5' 10\" (patient report)          BMI:  33.9   Weight Status:  Obesity Grade I    Weight:         Wt Readings from Last 1 Encounters:   24 107 kg (235 lb)        UBW: Not sure      IBW:  75 kg (166 lb) IBW %: 154%         Weight History:         Wt Readings from Last 10 Encounters:   10/24/23 116.4 kg (256 lb 9 oz)   23 120.2 kg (265 lb)      -Dec. '23 wt. was 253 lb.   - wt was 235 lb.   -Patient is now at 240 lb. Wt gain of 5 lb over the past 3 months.         Goal Weight:   -Not specified     Nutrition History     PMH:   Diabetes Mellitus Type II  HTN  Hyperlipidemia     Last visit w/patient was on 24. Nutrition goals set at that time were the followin) Track dietary intake using Cronometer and aim for 2115-9635 calories and 75-90 g protein per day.   -Hasn't been tracking.   2) Exercise 5 days days per week for 30-60 min   -Patient has been getting 30-60 min on the weekends. He has a gym membership but hasn't been using it. Struggling to find time to exercise.   3) Make snacks a combination of a protein and complex carb and always consume a bedtime snack.   -Focusing on complex carbs       -Higher dose of Mounjaro hasn't been available consistently. Was on the 12.5 dose for a while and now on the 15 dose. Went 3 weeks without any dose.   -Has been riding his bicycle more often.   -Will be starting a CGM again soon.      Labs: reviewed  23: Hgb A1c: 6.1%  -Per pharmacist note 24, BG has been at goal range >70% of the time.         Meds: reviewed  Mounjaro 15 mg weekly (increased on 23)  Metformin  mg " daily  Amlodipine 5 mg daily  Lisinopril-hydrochlorothiazide 20-12.5 mg daily  Rosuvastatin 20 mg daily      Supplements: reviewed        Social/Environmental:   -average sleep per night: not discussed  -level of stress: not discussed        Food Record  Breakfast: 9:00 am: Less hungry with Mounjaro, and has been skipping breakfast a few times. Smoked salmon 0.5 oz (26 kcal) with 4 cherry tomatoes, coffee, milk, water.   Lunch: 1/2 salad with 1/2 can of chicken 6.25 oz (Mediterranean crunch or maple bourbon shen or buffalo ranch) eats half of the pack now.   Dinner: Varies- small amount of brisket OR smoked salmon steak, 2 pieces of crusted tilapia, brown rice & quinoa OR can of beans, veggies.   Snacks: 2 small apples most days, stuffed olives with cheddar cheese, veggies with ranch, nuts, beef jerky stick   Beverages: Water, Coke Zero just once in a while (went from 3 days to 0 most days), sometimes instant coffee, trying to reduce caffeine   -Take-out - once per week - goes to a Mediterranean restaurant  -Works from home   -Eating organic brown rice with quinoa and added lentils and shredded cheese  -Including more greens in his diet         Nutritional Details:   -Food allergies: none  -Food intolerances: none  -Food sensitivities: none  -GI concerns:  none  -Appetite: good  -Pace of eating: tries not to eat too fast, sometimes in a hurry, mostly moderate speed  -Role of cooking: self  -Role of food shopping: self        Physical Activity:  -Joined [employer]. Finding that getting there is difficult. Has a rowing machine at home and did 30 min on it.   -[place]     ASSESSED MALNUTRITION STATUS  % Weight Loss:  None noted  % Intake:  No decreased intake noted  Subcutaneous Fat Loss:  Unable to determine  Loss of Muscle Mass:  Unable to determine  Fluid Retention:  None noted     Malnutrition Diagnosis:  Patient does not meet two of the above criteria necessary for diagnosing  malnutrition        Nutrition Diagnosis:  Excessive energy intake related to food and nutrition knowledge deficit as evidenced by usual dietary intake exceeding energy needs by estimated 150%, BMI 33.9, dx diabetes mellitus type II, and report of not knowing nutrition recommendations for weight loss.           Nutrition Intervention:  Nutrition Prescription Summary: MNT for Hyperlipidemia, HTN, and obesity       Nutrition Education (Content):  -Discussed goals set at last visit and barriers towards reaching them.   -Educated patient on ways to keep BG consistent throughout the day and to prevent hypoglycemia overnight and in the morning.   -Educated patient on how hydration effects BG levels  -Discussed how more plant-based foods can fill in the gap while in a calorie deficit to curb hunger  -Encouraged more physical activity.      Emailed/mailed information discussed including nutrition handouts to patient.         Nutrition Prescription: Macronutrient and Micronutrient details   Dosing weight: Current wt (115 kg) for energy and fluids, IBW (75 kg) for protein  Energy: 9833-3580 kcals/day ([place] [address] Jeor)     Justification:  (obesity, to promote a 2 lb wt loss per week)    Protein: 75-90 g Pro/day (1-1.2 g pro/Kg)     Justification: (obesity guidelines)    Fluid: 1492-2247 mL/day   (1 mL/Kcal)     Justification:  (obese)   Fiber:     Men (>50 years): 30 grams per day          Carbohydrate: 45-65% kcal   <25 g added sugar/day  2-3 CHO choices per meal and 1-2 CHO choices per snack        Fat: 20-35% kcal  <7% kcal from saturated fat   Micronutrient: DRI  <2,300 mg sodium/day                Vitamin and Mineral Supplements: n/a        Patient Engagement:   Assessed learning needs and learning preference: Yes.  Teaching Method(s) used: Explanation     Nutrition Education (Application):  a)  Discussed current eating plans / recommended alternative food choices     b)  Patient verbalizes understanding of diet by asking  questions      Anticipate >50% compliance   Stage of Change:  preparation        Nutrition Goals:  1) Track dietary intake using Cronometer and aim for 0040-1748 calories and 75-90 g protein per day.   2) Exercise 5 days days per week for 30-60 min   3) Make snacks a combination of a protein and complex carb and always consume a bedtime snack.      Nutrition Follow Up / Monitoring:   Weight, PO intake, PA, labs (A1c, BG, lipid panel)        Nutrition Recommendations:  Patient to follow-up with RD in 8 weeks.  Patient has RD contact information to call/email if needed.        Start time: 14:00  End time: 14:30     Total Time Duration: 30 min        Laurel Swain MS, RD, LD, [facility]  Clinical Dietitian  [phone]

## 2024-05-20 ENCOUNTER — TRANSFERRED RECORDS (OUTPATIENT)
Dept: HEALTH INFORMATION MANAGEMENT | Facility: CLINIC | Age: 54
End: 2024-05-20
Payer: COMMERCIAL

## 2024-05-20 LAB — HBA1C MFR BLD: 5.5 % (ref 4.8–5.6)

## 2024-05-24 NOTE — PROGRESS NOTES
Medication Therapy Management (MTM) Encounter    ASSESSMENT:                            Medication Adherence/Access: See below for considerations    Type 2 Diabetes:   Patient is meeting A1c goal of < 7%. Self monitoring of blood glucose is at goal of >70% in range.  Recommended finishing the Mounjaro 12.5 mg pens so as not to waste these, then increasing back to Mounjaro 15 mg/week as planned.  Due to some occasional lows recommended reducing metformin down to just 2 tablets daily and see if this helps. Unlikely to be pressure lows since they are occurring randomly throughout the day, but could be a more sensitive sensor.  Discussed that overall fingersticks and CGM have been matching, so no need to check both, unless he is having a low blood sugar.     Hypertension:   Stable. Patient is meeting blood pressure goal of < 140/90mmHg.     Hyperlipidemia:   Stable.  Patient is on high intensity statin which is indicated based on 2019 ACC/AHA guidelines for lipid management.      PLAN:                            Reduce metformin XR to 1000 mg daily  Finish up the Mounjaro 12.5 mg/week dose, then go back to the Mounjaro 15 mg/week as planned.    Follow-up: Return in about 8 weeks (around 7/23/2024) for Medication Therapy Management, Phone Visit.    SUBJECTIVE/OBJECTIVE:                          Billy Chavez is a 54 year old male called for a follow-up visit.       Reason for visit: diabetes.    Allergies/ADRs: Reviewed in chart  Past Medical History: Reviewed in chart  Tobacco: He reports that he has quit smoking. His smoking use included cigarettes. He has never used smokeless tobacco.  Alcohol: not currently using    Medication Adherence/Access: no issues reported    Diabetes   Type 2 Diabetes:    Mounjaro 12.5 mg weekly - Reports he just got three boxes of the 15 mg doses, so he won't be out for a while. Has two pens of 12.5 mg left and then will go back to 15 mg.  Metformin XR 1500 mg daily  Reports that he has  felt less hungry, so he does feel like the higher dose has been having an effect. Has noticed some weight loss at the higher doses. Reports he has been having more heartburn, but antacids seem to help. Trying to limit his spicy foods.  Is meeting with a nutritionist regularly to help with diet. Has been eating more fish and fiber than previously.  Blood sugar monitoring: CGM; Ranges: (patient reported) Reports he has some concerns that aston doesn't match fingersticks. Reports that they are either the same or within 20 points of each other.  Current diabetes symptoms: none  Diet/Exercise: Patient reports that he has been exercising more lately.              Wt Readings from Last 5 Encounters:   05/20/24 238 lb 8 oz (108.2 kg)   03/12/24 236 lb (107 kg)   10/24/23 256 lb 9 oz (116.4 kg)   06/21/23 265 lb (120.2 kg)            Hypertension   Amlodipine 5 mg daily  Lisinopril-hydrochlorothiazide 20-12.5 mg daily  Patient reports no current medication side effects. Occasional lightheadedness when moving from laying on the couch to standing. No concerns with falling and doesn't happen very often.  Patient does not self-monitor blood pressure.        BP Readings from Last 3 Encounters:   05/20/24 128/82   03/12/24 130/82   10/24/23 128/80       Hyperlipidemia   rosuvastatin 20 mg daily  Patient reports no significant myalgias or other side effects.                Today's Vitals: /82   Pulse 82   Wt 238 lb 8 oz (108.2 kg)   ----------------      I spent 20 minutes with this patient today. All changes were made via collaborative practice agreement with Min Tong MD. A copy of the visit note was provided to the patient's provider(s).    A summary of these recommendations was declined by the patient.    Zoila Soares, PharmD  Medication Therapy Management Pharmacist  Voicemail: [phone]      Telemedicine Visit Details  Type of service:  Telephone visit  Start Time:  8:30 AM  End Time:  8:50 AM      Medication Therapy Recommendations  Type 2 diabetes mellitus without complication, without long-term current use of insulin (H)    Current Medication: metFORMIN (GLUCOPHAGE XR) 500 MG 24 hr tablet   Rationale: Dose too high - Dosage too high - Safety   Recommendation: Decrease Dose   Status: Accepted per CPA

## 2024-05-28 ENCOUNTER — VIRTUAL VISIT (OUTPATIENT)
Dept: PHARMACY | Facility: PHYSICIAN GROUP | Age: 54
End: 2024-05-28
Payer: COMMERCIAL

## 2024-05-28 VITALS — DIASTOLIC BLOOD PRESSURE: 82 MMHG | WEIGHT: 238.5 LBS | SYSTOLIC BLOOD PRESSURE: 128 MMHG | HEART RATE: 82 BPM

## 2024-05-28 DIAGNOSIS — E11.9 TYPE 2 DIABETES MELLITUS WITHOUT COMPLICATION, WITHOUT LONG-TERM CURRENT USE OF INSULIN (H): Primary | ICD-10-CM

## 2024-05-28 DIAGNOSIS — E78.2 MIXED HYPERLIPIDEMIA: ICD-10-CM

## 2024-05-28 DIAGNOSIS — I10 HTN (HYPERTENSION), BENIGN: ICD-10-CM

## 2024-05-28 PROCEDURE — 99607 MTMS BY PHARM ADDL 15 MIN: CPT | Mod: 93 | Performed by: PHARMACIST

## 2024-05-28 PROCEDURE — 99606 MTMS BY PHARM EST 15 MIN: CPT | Mod: 93 | Performed by: PHARMACIST

## 2024-07-22 NOTE — PROGRESS NOTES
Medication Therapy Management (MTM) Encounter    ASSESSMENT:                            Medication Adherence/Access: No issues identified    Type 2 Diabetes:   Patient is meeting A1c goal of < 7%. Self monitoring of blood glucose is at goal of >70% in range.  Recommended continuing Mounjaro 15 mg weekly for blood sugar control and weight loss. Due to occasional low blood sugar readings, recommend reducing metformin down to just 500 mg daily.     Hypertension:   Patient is meeting blood pressure goal of < 140/90mmHg. Encouraged patient to make sure he is drinking plenty of water, as some of his lightheadedness and dizziness could be from dehydration, especially since it has been warmer lately.     Hyperlipidemia:   Stable.  Patient is on high intensity statin which is indicated based on 2019 ACC/AHA guidelines for lipid management.     PLAN:                            Reduce metformin XR down to just 500 mg daily to see if that corrects the occasional low blood sugars    Follow-up: Return in about 13 weeks (around 10/22/2024) for Medication Therapy Management, Phone Visit.    SUBJECTIVE/OBJECTIVE:                          Billy Chavez is a 54 year old male seen for a follow-up visit.       Reason for visit: diabetes.    Allergies/ADRs: Reviewed in chart  Past Medical History: Reviewed in chart  Tobacco: He reports that he has quit smoking. His smoking use included cigarettes. He has never used smokeless tobacco.  Alcohol: not currently using    Medication Adherence/Access: no issues reported    Diabetes   Type 2 Diabetes:    Mounjaro 15 mg weekly - Reports he has a several month supply of the 15 mg pens  Metformin XR 1000 mg daily  Reports that he has felt less hungry, so he does feel like the higher dose has been having an effect. Has noticed some weight loss at the higher doses. Reports he has been having more heartburn, but antacids seem to help. Trying to limit his spicy foods.  Last home weight: 225 lbs  Goal  weight: 175 lb  Is meeting with a nutritionist regularly to help with diet. Has been eating more fish and fiber than previously.  Blood sugar monitoring: CGM; Ranges: (patient reported) Reports he has some concerns that aston doesn't match fingersticks. Reports that they are either the same or within 20 points of each other.  Current diabetes symptoms: none  Diet/Exercise: Patient reports that he has been exercising more lately, more biking lately.              Wt Readings from Last 5 Encounters:   05/20/24 238 lb 8 oz (108.2 kg)   03/12/24 236 lb (107 kg)   10/24/23 256 lb 9 oz (116.4 kg)   06/21/23 265 lb (120.2 kg)            Hypertension   Amlodipine 5 mg daily  Lisinopril-hydrochlorothiazide 20-12.5 mg daily  Patient reports no current medication side effects. Occasional lightheadedness when moving from laying on the couch to standing or after exercising, acknowledges could be a little dehydrated lately. No concerns with falling and doesn't happen very often.  Patient does not self-monitor blood pressure.        BP Readings from Last 3 Encounters:   05/20/24 128/82   03/12/24 130/82   10/24/23 128/80       Hyperlipidemia   rosuvastatin 20 mg daily  Patient reports no significant myalgias or other side effects.                Today's Vitals: There were no vitals taken for this visit.  ----------------      I spent 17 minutes with this patient today. All changes were made via collaborative practice agreement with Min Tong MD. A copy of the visit note was provided to the patient's provider(s).    A summary of these recommendations was declined by the patient.    Zoila Soares, PharmD  Medication Therapy Management Pharmacist  Voicemail: [phone]      Telemedicine Visit Details  Type of service:  Telephone visit  Start Time:  8:30 AM  End Time:  8:47 AM     Medication Therapy Recommendations  Type 2 diabetes mellitus without complication, without long-term current use of insulin (H)    Current  Medication: metFORMIN (GLUCOPHAGE XR) 500 MG 24 hr tablet   Rationale: Dose too high - Dosage too high - Safety   Recommendation: Decrease Dose   Status: Accepted per CPA

## 2024-07-23 ENCOUNTER — VIRTUAL VISIT (OUTPATIENT)
Dept: PHARMACY | Facility: PHYSICIAN GROUP | Age: 54
End: 2024-07-23
Payer: COMMERCIAL

## 2024-07-23 DIAGNOSIS — E11.9 TYPE 2 DIABETES MELLITUS WITHOUT COMPLICATION, WITHOUT LONG-TERM CURRENT USE OF INSULIN (H): Primary | ICD-10-CM

## 2024-07-23 DIAGNOSIS — E78.2 MIXED HYPERLIPIDEMIA: ICD-10-CM

## 2024-07-23 DIAGNOSIS — I10 HTN (HYPERTENSION), BENIGN: ICD-10-CM

## 2024-07-23 PROCEDURE — 99607 MTMS BY PHARM ADDL 15 MIN: CPT | Mod: 93 | Performed by: PHARMACIST

## 2024-07-23 PROCEDURE — 99606 MTMS BY PHARM EST 15 MIN: CPT | Mod: 93 | Performed by: PHARMACIST

## 2024-07-23 NOTE — PATIENT INSTRUCTIONS
"Recommendations from today's MTM visit:                                                       Reduce metformin XR down to just 500 mg daily to see if that corrects the occasional low blood sugars    Follow-up: Return in about 13 weeks (around 10/22/2024) for Medication Therapy Management, Phone Visit.    It was great speaking with you today.  I value your experience and would be very thankful for your time in providing feedback in our clinic survey. In the next few days, you may receive an email or text message from [email or web address] with a link to a survey related to your  clinical pharmacist.\"     To schedule another MTM appointment, please call the clinic directly or you may call the MTM scheduling line at [phone].    My Clinical Pharmacist's contact information:                                                      Please feel free to contact me with any questions or concerns you have.      Zoila Soares, PharmD  Medication Therapy Management Pharmacist  Voicemail: [phone]     "

## 2024-08-05 ENCOUNTER — HOSPITAL ENCOUNTER (OUTPATIENT)
Dept: NUTRITION | Facility: HOSPITAL | Age: 54
Discharge: HOME | End: 2024-08-05
Attending: FAMILY MEDICINE
Payer: COMMERCIAL

## 2024-08-05 ENCOUNTER — TELEPHONE (OUTPATIENT)
Dept: NUTRITION | Facility: HOSPITAL | Age: 54
End: 2024-08-05
Payer: COMMERCIAL

## 2024-08-05 NOTE — PROGRESS NOTES
Nutrition Note    Patient was a no-show for today's telephone nutrition therapy follow-up visit.     Laurel Swain MS, RD, LD, [facility]  Clinical Dietitian  [phone]

## 2024-08-19 ENCOUNTER — HOSPITAL ENCOUNTER (OUTPATIENT)
Dept: NUTRITION | Facility: HOSPITAL | Age: 54
Discharge: HOME | End: 2024-08-19
Attending: FAMILY MEDICINE | Admitting: FAMILY MEDICINE
Payer: COMMERCIAL

## 2024-08-19 PROCEDURE — 97803 MED NUTRITION INDIV SUBSEQ: CPT | Mod: TEL,95 | Performed by: DIETITIAN, REGISTERED

## 2024-08-19 NOTE — PROGRESS NOTES
"[facility]  Medical Nutrition Therapy     Visit Type: Re-Assessment     Billy Chavez, 53 year old referred by Min Tong for MNT related to Hyperlipidemia, Hypertension, Obesity, and Diabetes        Virtual Visit Details     Type of service:  Telephone Visit   Phone call duration: 26 minutes       Nutrition Assessment:  Anthropometrics  Height: 5' 10\" (patient report)          BMI:  31.9   Weight Status:  Obesity Grade I    Weight:         Wt Readings from Last 1 Encounters:   24 99 kg (217.8 lb) on home scale         UBW: Not sure      IBW:  75 kg (166 lb) IBW %: 154%         Weight History:         Wt Readings from Last 10 Encounters:   10/24/23 116.4 kg (256 lb 9 oz)   23 120.2 kg (265 lb)      -Dec. '23 wt. was 253 lb.   - wt was 235 lb.   -May '24 wt was 240 lb.   -Current wt is at 217.8 lb  - wt loss of 48 lb over the past 1 year.       Goal Weight:   -Under 200 lb is his current goal weight. It's been decades since he was under 200 lb.      Nutrition History     PMH:   Diabetes Mellitus Type II  HTN  Hyperlipidemia     Last visit w/patient was on 24. Nutrition goals set at that time were the followin) Track dietary intake using Cronometer and aim for 3574-5140 calories and 75-90 g protein per day.   -Has been tracking calories using Cronometer. Finds that it's challenging to keep intake down to 1800 calories. Realized he wasn't eating enough protein. He has doubled it most days now and is over 100 g per day. Feels stronger now, building muscle in his thighs.   2) Exercise 5 days days per week for 30-60 min   -Has been riding his bike but not 5 days per week. Rides 3 miles more like 4 days per week.   3) Make snacks a combination of a protein and complex carb and always consume a bedtime snack.   -Met this goal. [employer] granola bars. Whole grain bread with peanut butter. Cheese with stuffed olives.      -Back on Mounjaro now.      Labs: " reviewed  6/5/23: Hgb A1c: 6.1%  -Per pharmacist note 4/16/24, BG has been at goal range >70% of the time.         Meds: reviewed  Mounjaro 15 mg weekly (increased on 12/19/23)  Metformin  mg daily  Amlodipine 5 mg daily  Lisinopril-hydrochlorothiazide 20-12.5 mg daily  Rosuvastatin 20 mg daily      Supplements: reviewed        Social/Environmental:   -average sleep per night: not discussed  -level of stress: not discussed        Food Record  Breakfast: 9:00 am: Less hungry with Mounjaro, and has been skipping breakfast a few times. Smoked salmon 0.5 oz (26 kcal) with 4 cherry tomatoes, coffee, milk, water.   Lunch: 1/2 salad with 1/2 can of chicken 6.25 oz (Mediterranean crunch or maple bourbon shen or buffalo ranch) eats half of the pack now.   Dinner: Varies- small amount of brisket OR smoked salmon steak, 2 pieces of crusted tilapia, brown rice & quinoa OR can of beans, veggies.   Snacks: 2 small apples most days, stuffed olives with cheddar cheese, veggies with ranch, nuts, beef jerky stick   Beverages: Water, Coke Zero just once in a while (went from 3 days to 0 most days), sometimes instant coffee, trying to reduce caffeine   -Take-out - once per week - goes to a Mediterranean restaurant  -Works from home   -Eating organic brown rice with quinoa and added lentils and shredded cheese  -Including more greens in his diet         Nutritional Details:   -Food allergies: none  -Food intolerances: none  -Food sensitivities: none  -GI concerns:  none  -Appetite: good  -Pace of eating: tries not to eat too fast, sometimes in a hurry, mostly moderate speed  -Role of cooking: self  -Role of food shopping: self        Physical Activity:  -Joined [employer]. Finding that getting there is difficult. Has a rowing machine at home and did 30 min on it.   [place]     ASSESSED MALNUTRITION STATUS  % Weight Loss:  None noted  % Intake:  No decreased intake noted  Subcutaneous Fat Loss:   Unable to determine  Loss of Muscle Mass:  Unable to determine  Fluid Retention:  None noted     Malnutrition Diagnosis:  Patient does not meet two of the above criteria necessary for diagnosing malnutrition        Nutrition Diagnosis:  Excessive energy intake related to food and nutrition knowledge deficit as evidenced by usual dietary intake exceeding energy needs by estimated 150%, BMI 33.9, dx diabetes mellitus type II, and report of not knowing nutrition recommendations for weight loss.           Nutrition Intervention:  Nutrition Prescription Summary: MNT for Hyperlipidemia, HTN, and obesity       Nutrition Education (Content):  -Discussed goals set at last visit and commended patient for reaching them  -Educated patient on determining a good goal weight  -Discussed exercise benefits  -Reviewed current food choices    Emailed/mailed information discussed including nutrition handouts to patient.         Nutrition Prescription: Macronutrient and Micronutrient details   Dosing weight: Current wt (115 kg) for energy and fluids, IBW (75 kg) for protein  Energy: 9180-5162 kcals/day ([place] Jeor)     Justification:  (obesity, to promote a 2 lb wt loss per week)    Protein: 75-90 g Pro/day (1-1.2 g pro/Kg)     Justification: (obesity guidelines)    Fluid: 1106-1259 mL/day   (1 mL/Kcal)     Justification:  (obese)   Fiber:     Men (>50 years): 30 grams per day          Carbohydrate: 45-65% kcal   <25 g added sugar/day  2-3 CHO choices per meal and 1-2 CHO choices per snack        Fat: 20-35% kcal  <7% kcal from saturated fat   Micronutrient: DRI  <2,300 mg sodium/day                Vitamin and Mineral Supplements: n/a        Patient Engagement:   Assessed learning needs and learning preference: Yes.  Teaching Method(s) used: Explanation     Nutrition Education (Application):  a)  Discussed current eating plans / recommended alternative food choices     b)  Patient verbalizes understanding of diet by asking  questions      Anticipate >50% compliance   Stage of Change:  preparation        Nutrition Goals:  1) Continue tracking dietary intake using Cronometer and aim for 9751-9030 calories and 75-90 g protein per day.   2) Exercise 5 days days per week for 60 min   3) Make snacks a combination of a protein and complex carb and always consume a bedtime snack.   4) Remember to use My Plate for meals     Nutrition Follow Up / Monitoring:   Weight, PO intake, PA, labs (A1c, BG, lipid panel)        Nutrition Recommendations:  Patient to follow-up with RD in 8 weeks.  Patient has RD contact information to call/email if needed.        Start time: 14:35  End time: 15:01     Total Time Duration: 26 min        Laurel Swain MS, RD, LD, [facility]  Clinical Dietitian  [phone]

## 2024-08-20 NOTE — PATIENT INSTRUCTIONS
"Recommendations from today's MTM visit:                                                       Let me know what [facility] I am happy to send a dose to whichever pharmacy you prefer.    Follow-up: Return in about 6 weeks (around 5/28/2024) for Medication Therapy Management, Phone Visit.    It was great speaking with you today.  I value your experience and would be very thankful for your time in providing feedback in our clinic survey. In the next few days, you may receive an email or text message from [employer] with a link to a survey related to your  clinical pharmacist.\"     To schedule another MTM appointment, please call the clinic directly or you may call the MTM scheduling line at [phone].    My Clinical Pharmacist's contact information:                                                      Please feel free to contact me with any questions or concerns you have.      Zoila Soares, PharmD  Medication Therapy Management Pharmacist  Voicemail: [phone]     " right knee not tested/no ROM deficits were identified right knee 0-80 degrees/Left LE ROM was WNL (within normal limits)

## 2024-10-21 NOTE — PROGRESS NOTES
Medication Therapy Management (MTM) Encounter    ASSESSMENT:                            Medication Adherence/Access: No issues identified.    Type 2 Diabetes:   Patient is meeting A1c goal of < 7%. Self monitoring of blood glucose is at goal of >70% in range.  Recommended continuing Mounjaro 15 mg weekly for blood sugar control and weight loss.   Encouraged patient to try creating a dexcom account through his computer and then logging in with this account in the jamie. Discussed if this doesn't work I don't know what else to do and I would call Dexcom support for assistance.  Next visit will have him put in dexcom code at [employer]     Hypertension:   Patient is almost meeting blood pressure goal of < 130/80mmHg. Repeat blood pressure check at PCP visit next week.     Hyperlipidemia:   Stable.  Patient is on high intensity statin which is indicated based on 2019 ACC/AHA guidelines for lipid management.     PLAN:                            Try going to [employer] on your computer to setup the Dexcom account and then log into the jamie with account you created.    Follow-up: Return in about 4 weeks (around 11/19/2024) for Medication Therapy Management, Phone Visit.    SUBJECTIVE/OBJECTIVE:                          Billy Chavez is a 54 year old male seen for a follow-up visit.       Reason for visit: diabetes.    Allergies/ADRs: Reviewed in chart  Past Medical History: Reviewed in chart  Tobacco: He reports that he has quit smoking. His smoking use included cigarettes. He has never used smokeless tobacco.  Alcohol: not currently using    Medication Adherence/Access: no issues reported.    Diabetes   Type 2 Diabetes:    Mounjaro 15 mg weekly   Metformin  mg daily  Reports that he has felt less hungry, so he does feel like the higher dose has been having an effect. Reports he has somewhat reached a steady state though with his weight, trying to push past this to at least get under 200 lbs.  Last  home weight: 214 lbs  Goal weight: 175 lb  Is meeting with a nutritionist regularly to help with diet. Has been eating more fish and fiber than previously.  Blood sugar monitoring: CGM; Ranges: (patient reported) Reports that he hasn't be able to create and account. Dexcom jamie says his wifi and data isn't secure. Reports he may have to call the dexcom company to get this setup.  Current diabetes symptoms: none  Diet/Exercise: Patient reports that he has been exercising more lately, more biking lately.             Wt Readings from Last 5 Encounters:   05/20/24 238 lb 8 oz (108.2 kg)   03/12/24 236 lb (107 kg)   10/24/23 256 lb 9 oz (116.4 kg)   06/21/23 265 lb (120.2 kg)         Hypertension   Amlodipine 5 mg daily  Lisinopril-hydrochlorothiazide 20-12.5 mg daily  Patient reports no current medication side effects. Occasional lightheadedness when moving from laying on the couch to standing or after exercising, acknowledges could be a little dehydrated lately. No concerns with falling and doesn't happen very often.  Patient does not self-monitor blood pressure.        BP Readings from Last 3 Encounters:   05/20/24 128/82   03/12/24 130/82   10/24/23 128/80       Hyperlipidemia   rosuvastatin 20 mg daily  Patient reports no significant myalgias or other side effects.                Today's Vitals: There were no vitals taken for this visit.  ----------------      I spent 20 minutes with this patient today. All changes were made via collaborative practice agreement with Min Tong MD. A copy of the visit note was provided to the patient's provider(s).    A summary of these recommendations was declined by the patient.    Zoila Soares, PharmD  Medication Therapy Management Pharmacist  Voicemail: [phone]      Telemedicine Visit Details  The patient's medications can be safely assessed via a telemedicine encounter.  Type of service:  Telephone visit  Originating Location (pt. Location): Home    Distant  Location (provider location):  On-site  Start Time:  8:30 AM  End Time:  8:50 AM     Medication Therapy Recommendations  No medication therapy recommendations to display

## 2024-10-22 ENCOUNTER — VIRTUAL VISIT (OUTPATIENT)
Dept: PHARMACY | Facility: PHYSICIAN GROUP | Age: 54
End: 2024-10-22
Payer: COMMERCIAL

## 2024-10-22 DIAGNOSIS — I10 HTN (HYPERTENSION), BENIGN: ICD-10-CM

## 2024-10-22 DIAGNOSIS — E11.9 TYPE 2 DIABETES MELLITUS WITHOUT COMPLICATION, WITHOUT LONG-TERM CURRENT USE OF INSULIN (H): Primary | ICD-10-CM

## 2024-10-22 DIAGNOSIS — E78.2 MIXED HYPERLIPIDEMIA: ICD-10-CM

## 2024-10-22 PROCEDURE — 99607 MTMS BY PHARM ADDL 15 MIN: CPT | Mod: 93 | Performed by: PHARMACIST

## 2024-10-22 PROCEDURE — 99606 MTMS BY PHARM EST 15 MIN: CPT | Mod: 93 | Performed by: PHARMACIST

## 2024-10-22 NOTE — PATIENT INSTRUCTIONS
"Recommendations from today's MTM visit:                                                       Try going to [email or web address] on your computer to setup the Dexcom account and then log into the jamie with account you created.    Follow-up: Return in about 4 weeks (around 11/19/2024) for Medication Therapy Management, Phone Visit.    It was great speaking with you today.  I value your experience and would be very thankful for your time in providing feedback in our clinic survey. In the next few days, you may receive an email or text message from [employer] with a link to a survey related to your  clinical pharmacist.\"     To schedule another MTM appointment, please call the clinic directly or you may call the MTM scheduling line at [phone].    My Clinical Pharmacist's contact information:                                                      Please feel free to contact me with any questions or concerns you have.      Zoila Soares, PharmD  Medication Therapy Management Pharmacist  Voicemail: [phone]     "

## 2024-10-28 ENCOUNTER — TRANSFERRED RECORDS (OUTPATIENT)
Dept: HEALTH INFORMATION MANAGEMENT | Facility: CLINIC | Age: 54
End: 2024-10-28

## 2024-11-18 NOTE — PROGRESS NOTES
Medication Therapy Management (MTM) Encounter    ASSESSMENT:                            Medication Adherence/Access: No issues identified.    Type 2 Diabetes:   Patient is meeting A1c goal of < 7%. Self monitoring of blood glucose is at goal of >70% in range.  Recommended continuing Mounjaro 15 mg weekly for blood sugar control and weight loss.      Hypertension:   Patient is almost meeting blood pressure goal of < 130/80mmHg. Repeat blood pressure check at next in clinic visit.     Hyperlipidemia:   Stable.  Patient is on high intensity statin which is indicated based on 2019 ACC/AHA guidelines for lipid management.     PLAN:                            Continue your current medications unchanged    Follow-up: Return in about 10 weeks (around 1/28/2025) for Medication Therapy Management, Phone Visit.    SUBJECTIVE/OBJECTIVE:                          Billy Chavez is a 54 year old male seen for a follow-up visit.       Reason for visit: diabetes.    Allergies/ADRs: Reviewed in chart  Past Medical History: Reviewed in chart  Tobacco: He reports that he has quit smoking. His smoking use included cigarettes. He has never used smokeless tobacco.  Alcohol: not currently using    Medication Adherence/Access: no issues reported.    Diabetes   Type 2 Diabetes:    Mounjaro 15 mg weekly   Metformin  mg daily  Reports he has somewhat reached a steady state though with his weight, trying to push past this to at least get under 200 lbs.  Last home weight: hasn't weighed himself recently, last in clinic weight was 220 lb  Goal weight: 175 lb  Is meeting with a nutritionist regularly to help with diet. Has been eating more fish and fiber than previously.  Blood sugar monitoring: CGM; Ranges: (patient reported) His phone jamie wasn't working, so has just been using the dexcom reader which has worked well. Reports during visit sugar was 105 mg/dL. Reports they have still been good and rarely out of range.  Current diabetes  symptoms: none  Diet/Exercise: Patient reports that he has been exercising more lately, more biking lately.              Hypertension   Amlodipine 5 mg daily  Lisinopril-hydrochlorothiazide 20-12.5 mg daily  Patient reports no current medication side effects. Occasional lightheadedness when moving from laying on the couch to standing or after exercising, acknowledges could be a little dehydrated lately. No concerns with falling and doesn't happen very often.  Patient does not self-monitor blood pressure.        BP Readings from Last 3 Encounters:   05/20/24 128/82   03/12/24 130/82   10/24/23 128/80       Hyperlipidemia   rosuvastatin 20 mg daily  Patient reports no significant myalgias or other side effects.                Today's Vitals: There were no vitals taken for this visit.  ----------------      I spent 9 minutes with this patient today. All changes were made via collaborative practice agreement with Min Tong MD. A copy of the visit note was provided to the patient's provider(s).    A summary of these recommendations was declined by the patient.    Zoila Soares, PharmD  Medication Therapy Management Pharmacist  Voicemail: [phone]      Telemedicine Visit Details  The patient's medications can be safely assessed via a telemedicine encounter.  Type of service:  Telephone visit  Originating Location (pt. Location): Home    Distant Location (provider location):  On-site  Start Time:  8:30 AM  End Time: 8:40 AM     Medication Therapy Recommendations  No medication therapy recommendations to display

## 2024-11-19 ENCOUNTER — VIRTUAL VISIT (OUTPATIENT)
Dept: PHARMACY | Facility: PHYSICIAN GROUP | Age: 54
End: 2024-11-19
Payer: COMMERCIAL

## 2024-11-19 DIAGNOSIS — E78.2 MIXED HYPERLIPIDEMIA: ICD-10-CM

## 2024-11-19 DIAGNOSIS — E11.9 TYPE 2 DIABETES MELLITUS WITHOUT COMPLICATION, WITHOUT LONG-TERM CURRENT USE OF INSULIN (H): Primary | ICD-10-CM

## 2024-11-19 DIAGNOSIS — I10 HTN (HYPERTENSION), BENIGN: ICD-10-CM

## 2024-11-19 NOTE — PATIENT INSTRUCTIONS
"Recommendations from today's MTM visit:                                                       Continue your current medications unchanged    Follow-up: Return in about 10 weeks (around 1/28/2025) for Medication Therapy Management, Phone Visit.    It was great speaking with you today.  I value your experience and would be very thankful for your time in providing feedback in our clinic survey. In the next few days, you may receive an email or text message from [facility] with a link to a survey related to your  clinical pharmacist.\"     To schedule another MTM appointment, please call the clinic directly or you may call the MTM scheduling line at [phone] or toll-free at [phone].     My Clinical Pharmacist's contact information:                                                      Please feel free to contact me with any questions or concerns you have.      Zoila Soares, PharmD  Medication Therapy Management Pharmacist  Voicemail: [phone]     "

## 2024-11-25 ENCOUNTER — HOSPITAL ENCOUNTER (OUTPATIENT)
Dept: NUTRITION | Facility: HOSPITAL | Age: 54
Discharge: HOME | End: 2024-11-25
Attending: FAMILY MEDICINE
Payer: COMMERCIAL

## 2024-11-25 PROCEDURE — 97803 MED NUTRITION INDIV SUBSEQ: CPT | Mod: TEL,95 | Performed by: DIETITIAN, REGISTERED

## 2024-11-25 NOTE — PROGRESS NOTES
"[facility]  Medical Nutrition Therapy     Visit Type: Re-Assessment     Billy Chavez, 53 year old referred by Min Tong for MNT related to Hyperlipidemia, Hypertension, Obesity, and Diabetes        Virtual Visit Details     Type of service:  Telephone Visit   Phone call duration: 25 minutes       Nutrition Assessment:  Anthropometrics  Height: 5' 10\" (patient report)          BMI:  31.9   Weight Status:  Obesity Grade I    Weight:         Wt Readings from Last 1 Encounters:   24 99 kg (217.8 lb) on home scale         UBW: Not sure      IBW:  75 kg (166 lb) IBW %: 154%         Weight History:         Wt Readings from Last 10 Encounters:   10/24/23 116.4 kg (256 lb 9 oz)   23 120.2 kg (265 lb)      -Dec. '23 wt. was 253 lb.   - wt was 235 lb.   -May '24 wt was 240 lb.   -Aug. '24 wt was 217.8 lb   -Current wt is at 213-215 lb.   -Wt loss of 50 lb over the past 1 year.      Goal Weight:   -Under 200 lb is his current goal weight. It's been decades since he was under 200 lb.      Nutrition History     PMH:   Diabetes Mellitus Type II  HTN  Hyperlipidemia     Last visit w/patient was on 24. Nutrition goals set at that time were the followin) Continue tracking dietary intake using Cronometer and aim for 2380-5255 calories and 75-90 g protein per day.   -Hasn't been tracking that closely.   2) Exercise 5 days days per week for 60 min   -Hasn't been consistently meeting this goal.   3) Make snacks a combination of a protein and complex carb and always consume a bedtime snack.   -Has been trying to follow this snack recommendation.   4) Remember to use My Plate for meals  -Has an increased appetite when it gets cold  -Using a rowing machine now.   -Was doing a lot of biking in the summer and fall. Biking during lunch (6-7 miles) and on the weekends (20 miles)     -Still on Mounjaro and feels that it's had a huge impact on his appetite and suppressing cravings.      Labs: " reviewed  6/5/23: Hgb A1c: 6.1%  -BG has been within range. Using Dexcom CGM        Meds: reviewed  Mounjaro 15 mg weekly (increased on 12/19/23)  Metformin  mg daily  Amlodipine 5 mg daily  Lisinopril-hydrochlorothiazide 20-12.5 mg daily  Rosuvastatin 20 mg daily      Supplements: reviewed        Social/Environmental:   -average sleep per night: not discussed  -level of stress: not discussed        Food Record  Breakfast: 9:00 am: Less hungry with Mounjaro, and has been skipping breakfast a few times. Smoked salmon 0.5 oz (26 kcal), whole grain bread, with 4 cherry tomatoes, coffee, milk, water.   Lunch: 1/3 salad with 1/2 can of chicken 6.25 oz (Mediterranean crunch or maple bourbon shen or buffalo ranch) eats half of the pack now. Fruit, crunchy granola bar.   Dinner: Varies- small amount of brisket OR smoked salmon steak, 2 pieces of crusted tilapia, brown rice & quinoa OR can of beans, veggies.   Snacks: Greek yogurt with maple syrup, 2 small apples most days, stuffed olives with cheddar cheese, veggies with ranch, nuts, beef jerky stick   Beverages: Water, Coke Zero just once in a while (went from 3 days to 0 most days), sometimes instant coffee, trying to reduce caffeine   -Take-out - once per week - goes to a Mediterranean restaurant  -Works from home   -Eating organic brown rice with quinoa and added lentils and shredded cheese  -Including more greens in his diet         Nutritional Details:   -Food allergies: none  -Food intolerances: none  -Food sensitivities: none  -GI concerns:  none  -Appetite: good  -Pace of eating: tries not to eat too fast, sometimes in a hurry, mostly moderate speed  -Role of cooking: self  -Role of food shopping: self        Physical Activity:  -Joined [employer]. Finding that getting there is difficult. Has a rowing machine at home and did 30 min on it.   [place]     ASSESSED MALNUTRITION STATUS  % Weight Loss:  None noted  % Intake:  No  decreased intake noted  Subcutaneous Fat Loss:  Unable to determine  Loss of Muscle Mass:  Unable to determine  Fluid Retention:  None noted     Malnutrition Diagnosis:  Patient does not meet two of the above criteria necessary for diagnosing malnutrition        Nutrition Diagnosis:  Excessive energy intake related to food and nutrition knowledge deficit as evidenced by usual dietary intake exceeding energy needs by estimated 150%, BMI 33.9, dx diabetes mellitus type II, and report of not knowing nutrition recommendations for weight loss.           Nutrition Intervention:  Nutrition Prescription Summary: MNT for Hyperlipidemia, HTN, and obesity       Nutrition Education (Content):  -Discussed goals set at last visit and commended patient for reaching most them and barriers to some of them   -Recommended getting in more fiber in the diet to help with naturally suppressing appetite   -Discussed exercise benefits  -Reviewed current food choices  -Recommended to get in a carb+protein with evening snack to help prevent overnight hypoglycemia (peanut butter and whole wheat toast or nuts with dried fruit or yogurt with fruit)     Emailed/mailed information discussed including nutrition handouts to patient.         Nutrition Prescription: Macronutrient and Micronutrient details   Dosing weight: Current wt (115 kg) for energy and fluids, IBW (75 kg) for protein  Energy: 3862-5482 kcals/day ([place] Jeor)     Justification:  (obesity, to promote a 2 lb wt loss per week)    Protein: 75-90 g Pro/day (1-1.2 g pro/Kg)     Justification: (obesity guidelines)    Fluid: 4251-0760 mL/day   (1 mL/Kcal)     Justification:  (obese)   Fiber:     Men (>50 years): 30 grams per day          Carbohydrate: 45-65% kcal   <25 g added sugar/day  2-3 CHO choices per meal and 1-2 CHO choices per snack        Fat: 20-35% kcal  <7% kcal from saturated fat   Micronutrient: DRI  <2,300 mg sodium/day                Vitamin and Mineral Supplements:  n/a        Patient Engagement:   Assessed learning needs and learning preference: Yes.  Teaching Method(s) used: Explanation     Nutrition Education (Application):  a)  Discussed current eating plans / recommended alternative food choices     b)  Patient verbalizes understanding of diet by asking questions      Anticipate >50% compliance   Stage of Change:  preparation        Nutrition Goals:  1) Continue tracking dietary intake using Cronometer and aim for 3168-4666 calories and 75-90 g protein per day.   2) Exercise 5 days days per week for 60 min   3) Make snacks a combination of a protein and complex carb and always consume a bedtime snack.   4) Remember to use My Plate for meals     Nutrition Follow Up / Monitoring:   Weight, PO intake, PA, labs (A1c, BG, lipid panel)        Nutrition Recommendations:  Patient to follow-up with RD in 8 weeks.  Patient has RD contact information to call/email if needed.        Start time: 14:05  End time: 15:00     Total Time Duration: 25 min        Laurel Swain MS, RD, LD, [facility]  Clinical Dietitian  [phone]

## 2024-12-31 ENCOUNTER — OFFICE VISIT (OUTPATIENT)
Dept: SLEEP MEDICINE | Facility: CLINIC | Age: 54
End: 2024-12-31
Payer: COMMERCIAL

## 2024-12-31 VITALS
DIASTOLIC BLOOD PRESSURE: 84 MMHG | OXYGEN SATURATION: 95 % | SYSTOLIC BLOOD PRESSURE: 123 MMHG | WEIGHT: 228.2 LBS | HEIGHT: 70 IN | HEART RATE: 84 BPM | BODY MASS INDEX: 32.67 KG/M2

## 2024-12-31 DIAGNOSIS — G47.33 OSA (OBSTRUCTIVE SLEEP APNEA): Primary | ICD-10-CM

## 2024-12-31 DIAGNOSIS — F51.12 INSUFFICIENT SLEEP SYNDROME: ICD-10-CM

## 2024-12-31 DIAGNOSIS — G47.21 CIRCADIAN RHYTHM SLEEP DISORDER, DELAYED SLEEP PHASE TYPE: ICD-10-CM

## 2024-12-31 PROBLEM — E11.9 CONTROLLED TYPE 2 DIABETES MELLITUS WITHOUT COMPLICATION, WITHOUT LONG-TERM CURRENT USE OF INSULIN (H): Status: ACTIVE | Noted: 2021-06-07

## 2024-12-31 PROCEDURE — 99204 OFFICE O/P NEW MOD 45 MIN: CPT | Performed by: INTERNAL MEDICINE

## 2024-12-31 ASSESSMENT — SLEEP AND FATIGUE QUESTIONNAIRES
HOW LIKELY ARE YOU TO NOD OFF OR FALL ASLEEP WHILE SITTING AND TALKING TO SOMEONE: WOULD NEVER DOZE
HOW LIKELY ARE YOU TO NOD OFF OR FALL ASLEEP WHEN YOU ARE A PASSENGER IN A CAR FOR AN HOUR WITHOUT A BREAK: MODERATE CHANCE OF DOZING
HOW LIKELY ARE YOU TO NOD OFF OR FALL ASLEEP WHILE WATCHING TV: SLIGHT CHANCE OF DOZING
HOW LIKELY ARE YOU TO NOD OFF OR FALL ASLEEP IN A CAR, WHILE STOPPED FOR A FEW MINUTES IN TRAFFIC: WOULD NEVER DOZE
HOW LIKELY ARE YOU TO NOD OFF OR FALL ASLEEP WHILE LYING DOWN TO REST IN THE AFTERNOON WHEN CIRCUMSTANCES PERMIT: MODERATE CHANCE OF DOZING
HOW LIKELY ARE YOU TO NOD OFF OR FALL ASLEEP WHILE SITTING INACTIVE IN A PUBLIC PLACE: WOULD NEVER DOZE
HOW LIKELY ARE YOU TO NOD OFF OR FALL ASLEEP WHILE SITTING QUIETLY AFTER LUNCH WITHOUT ALCOHOL: SLIGHT CHANCE OF DOZING
HOW LIKELY ARE YOU TO NOD OFF OR FALL ASLEEP WHILE SITTING AND READING: SLIGHT CHANCE OF DOZING

## 2024-12-31 NOTE — PROGRESS NOTES
[facility] [address] [address]  Phone: [phone]    Patient:  Billy Chavez, Date of birth 1970  Date of Visit:  12/31/2024                Assessment and Plan:   Billy Chavez is a 54 year old male with history of hypertension, type 2 diabetes diabetes mellitus, history of smoking, class II obesity was initially diagnosed with severe obstructive sleep apnea in 2008 and has been on CPAP therapy since then is here to reestablish his ongoing sleep medicine care.  Sleep disordered breathing.  History of severe obstructive sleep apnea diagnosed with a type I polysomnogram in May 2008 with a combined apnea hypopnea index of 41 events per hour has been on effective CPAP therapy since then.  His most recent treatment comprises of auto titration CPAP 10-15 cm of water with his 90th percentile pressure averaging at 10.5 cm of water and residual AHI of 0.3 events per hour, showing effective therapy.  There is excellent CPAP compliance of 100% of greater than 4-hour nightly usage.  His most recent usage does reveal a large air leak for over 2 hours as likely due to the wearing dpwn of his CPAP interface.  Circadian rhythm-delayed sleep phase disorder.  Billy's intrinsic circadian rhythm is likely delayed by 5 hours with his natural sleep time around 3 AM and wake time around noon.  During the workweek he is usually sleeping between midnight to 1 AM and has to wake up at 6:50 AM with an alarm averaging just under 6 hours of sleep.  Over the weekend he would sleep between 2 AM to 3 AM and will wake around 10 AM to 11 AM  causing social jet lag about 2 to 3 hours at the start of his workweek.  Insufficient sleep.  Due to the above-mentioned reasons his cumulative sleep during the workweek is just under 6 hours with almost 2 hours of sleep deprivation per day and a cumulative sleep deprivation of up to 10 hours in a week.  There is some partial  compensation over the weekends but is usually minimized by further phase delay and sleep.        Comorbid Diagnoses:    Type 2 diabetes mellitus.  Hypertension.    History of tobacco use.  Class II obesity.    Summary Recommendations:    Continue current therapy with auto titrating CPAP 10-15 cm of water.  Prescription for new PAP supplies including the interface was provided to the patient today.  An Celi view or F30 I hybrid facemask should be used with a chinstrap  He strongly advised to maintain a consistent bed and wake time.  He should sleep no later than 11 PM with a 7 AM awakening during his workweek while over the weekend which are sleep around 11 PM with a wake time between 8 AM to 9 AM.  Sleep hygiene with focus on maintaining a consistent bed and wake time was discussed and recommended.    Summary Counseling:    Check out [email or web address]           History of Present Illness:     Billy Chavez is a 54 year old male with above-mentioned medical history was initially diagnosed with severe obstructive sleep apnea when he underwent a type I polysomnogram, split-night protocol at the [place] [facility].  He was prescribed CPAP and was routinely followed up at [facility].  Over the past year with a change in his medical insurance [facility].  He is here to reestablish his ongoing sleep medicine.  Follow-up with request for new PAP supplies particularly his mask.  Since his diagnosis, he has used the CPAP therapy consistently and effectively.  His most recent setting, auto titrating CPAP 10-15 cm of water is delivered through an Celi view hybrid facemask with his 90th percentile pressure at 10.5 cm and an average usage of just under 7 hours.  His residual apnea hypopnea index is at 0.3 events per hour which is consistent with effective therapy.     Billy has been a night owl throughout his adult life would prefer to  sleep between 2 AM to 3 AM and will wake up naturally between 11 AM to noon.  Over years, he is able to advance his sleep but does struggle with maintaining a consistent bedtime and overall insufficient sleep.  During the weekdays he will go to sleep around midnight and usually wakes up with an alarm around 6:50 AM.  Over the weekend he will sleep around 2 AM (more recently, during the holiday season, around 3 AM) and if given the opportunity would sleep until 11 AM to noon.  During the night he will wake up once to use the bathroom and on occasion will struggle falling back to sleep particularly if he wakes up after 5 AM.  He does have a significant air leak of over 2 hours which has been worse recently with a rare diagnosis old CPAP mask.      PREVIOUS SLEEP STUDIES: Type I polysomnogram, split-night protocol  [facility]    Date: 5/13/2008  AHI: 41/h  Intervention: CPAP 7 cm of water  Sleep Architecture: Total sleep time 408.5 minutes, sleep latency 4 minutes, REM sleep latency 102.5 minutes, sleep efficiency 79.3%    CURRENT THERAPY-Subjective:  Sleep wake schedule:   Workday bedtime 12:00 AM  Awakening 6:50 AM using alarm   Nonworkday bedtime 2:00 AM Awakening 10:00 AM   0-1 Awakenings for 15 minutes usually to micturate/week  Naps: Avoids nap     Overall, he rates the experience with PAP as 8 (0 poor, 10 great). The mask is not comfortable. The mask is leaking.  He is not snoring with the mask on. He is not having gasp arousals.  He is having significant oral/nasal dryness. The pressure is comfortable.     His PAP interface is Full Face Mask. Celi view    He does feel rested in the morning.    [place] Sleepiness Scale: 7/24    DIOR Total Score: (Patient-Rptd) 9      Objective:  CPAP Compliance Targets:   >70% days > 4 hours AHI < 5   30 days ending December 31, 2024  Respironics: Replaced 2021  Auto-PAP 10-15 cmH2O 30 day usage data:    100% of days with > 4 hours of  "use.  0/30 days with no use.   Average use 6 hours 52 minutes per day.   Average time in large leak 2 hours 22 minutes.  CPAP 90% pressure 10.5 cm.   AHI 0.3 events per hour.            Medications:     Current Outpatient Medications   Medication Sig Dispense Refill    amLODIPine (NORVASC) 5 MG tablet Take 5 mg by mouth daily      lisinopril 10 MG TABS 20 mg, hydrochlorothiazide 12.5 MG CAPS 12.5 mg Take by mouth daily      metFORMIN (GLUCOPHAGE XR) 500 MG 24 hr tablet Take 500 mg by mouth daily (with dinner)      Multiple Vitamin (MULTIVITAMIN ADULT PO) Take 1 tablet by mouth twice a week      rosuvastatin (CRESTOR) 20 MG tablet Take 20 mg by mouth daily      sildenafil (REVATIO) 20 MG tablet Take 20 mg by mouth 3 times daily      tirzepatide (MOUNJARO) 15 MG/0.5ML pen Inject 15 mg Subcutaneous every 7 days      vitamin D3 (CHOLECALCIFEROL) 50 mcg (2000 units) tablet Take 1 tablet by mouth daily       No current facility-administered medications for this visit.        No Known Allergies         Past Medical History:     Does not need 02 supplement at night   No past medical history on file.          Past Surgical History:    No h/o  upper airway surgery  No past surgical history on file.         Physical Examination:     Objective   Vitals: /84   Pulse 84   Ht 1.778 m (5' 10\")   Wt 103.5 kg (228 lb 3.2 oz)   SpO2 95%   BMI 32.74 kg/m     General: healthy, alert, and no distress  Psych: Alert and oriented times 3; coherent speech, normal   rate and volume, able to articulate logical thoughts, able   to abstract reason, no tangential thoughts, no hallucinations   or delusions  His affect is normal    Copy to: Min Tong    Total of 58 minutes of time was spent with patient, this included the interview and exam, and review of the chart/labs/imaging/sleep study/PAP therapy data on 12/31/2024 . Greater than 50% of which was spent counseling and coordinating care.   Vick Hurtado MD 12/31/2024     M " [facility]   Floor 1, [address]   [address] [place] [address]   Appointments: [phone]

## 2024-12-31 NOTE — PATIENT INSTRUCTIONS
"DELAYED SLEEP PHASE  What is it?   Delayed sleep phase disorder (DSP) is a circadian rhythm disorder. It consists of a typical sleep pattern that is \"delayed\" by two or more hours. This delay occurs when one s internal sleep clock (circadian rhythm) is shifted later at night and later in the morning. Once sleep occurs, the sleep is generally normal. But the delay leads to a pattern of sleep that is later than what is desired or what is considered socially acceptable. This pattern can be a problem when it interferes with work or social demands.  A person with DSP is likely to prefer late bedtimes and late wake-up times. When left to his or her own schedule, a person with DSP is likely to have a normal amount and quality of sleep. It simply occurs at a delayed time. One sign of this disorder is difficulty falling asleep until late at night. Another sign is having a hard time getting out of bed in the morning for work or school. These signs can make DSP look like insomnia. Daytime functioning can be severely impaired by DSP. It can lead to excessive sleepiness and fatigue. When able to sleep on their own schedules, people with DSP often stay up until they get tired and then sleep until they awaken late in the morning. In this case, they tend to have no complaint of difficulty falling to sleep or feeling poorly during the day.         Who gets it?   The exact rate of DSP is unknown in the general population. It is much more common in teens and young adults. From 7% to 16% of them may have it. DSP is likely to be found in 10% of people with a complaint of chronic insomnia. People who tend to be \"evening types\" or \"night owls\" are likely to develop DSP.     There is likely to be some genetic component. Some environmental factors may also be involved. A lack of exposure to morning sunlight may make it worse. Too much exposure to bright evening sunlight may also increase symptoms of DSP. A family history of DSP is common in " about 40% of people with the disorder.     How do I know if I have it?   Is there a delay in your sleep pattern in relation to your desired sleep and wake times? Do you have trouble falling asleep at the desired time of night? Are you then unable to awaken at the desired or socially acceptable time?   When left to your own sleep schedule, do you have a normal duration and quality of sleep? Does this sleep occur in a stable, but delayed time period in relation to what is desired or socially acceptable?   Have you had this kind of stable but delayed sleep time for at least seven days?   If you answered  yes  to these questions, then you may have delayed sleep phase disorder.  It is also important to know if there is something else that is causing your sleep problems. They may be a result of one of the following:  Another sleep disorder   A medical condition   Medication use   A mental health disorder   Substance abuse            Do I need to see a sleep specialist?       LIGHT THERAPY  What is it?  Light therapy is a treatment used for people who suffer from circadian rhythm sleep disorders. Your body has an internal clock that tells it when it is time to be asleep and when it is time to be awake.     This clock is located in the brain just above an area where the nerves travel to the eyes. This area is called the SCN. Your clock controls the  circadian rhythms  in your body. These rhythms include body temperature, alertness and the daily cycle of many hormones.     The word  circadian  means to occur in a cycle of about 24 hours. Circadian rhythms make you feel sleepy or alert at regular times every day. Some people have a circadian rhythm sleep disorder. This causes their natural sleep time to overlap with regular awake activities such as work or school.   Among other factors, your clock is  set  by your exposure to bright light such as sunlight. Exposure to bright light or  light therapy  is one method used to  treat people with a circadian rhythm sleep disorder.     The goal for treating patients who have circadian rhythm problems is to combine a healthy sleep pattern with an internal clock that is set at the right time. This will allow them to enjoy the benefits of good sleep.     Light therapy can help someone  re-set  a clock that is off. Regular sleep patterns help to keep the clock set at the new time. Light therapy is only part of a treatment plan that should be guided by a doctor who is familiar with sleep disorders.   Light therapy is used to expose your eyes to intense but safe amounts of light for a specific and regular length of time. In many places, sunlight is not available at the proper time to be used as treatment.     Artificial light may be used to affect the body clock in the same way that sunlight does. New advances continue to be made in this field. Currently, products that are used for light therapy fit into four basic groups:   1. Light Box   This is the most common tool that is used in light therapy. The box houses several tubes that produce extremely bright light. It sits on top of a table or desk and plugs into the wall.     During a treatment session, you have to keep within a certain distance of the box. Usually, you will be about 18 to 24 inches away from it. It does not require you to look directly into the light. Instead, you simply face in the direction of the box.     You are able to do other activities during the session. Ideally, you would work on papers or read something that is in the area being lit up. This will allow the light to be received by your eyes. Your body takes in this information and uses it to regulate the rhythms that control when you sleep and when you wake.   Earlier models of light boxes put out 2,500 to 5,000 lux of light. Lux is a measure of how much light falls on your eyes. These sessions could take two or three hours. Now, many boxes produce 10,000 lux of light.  This allows sessions to take as little as 15 to 30 minutes.     More than one session may be needed each day. It depends upon your body, your need, and the strength of light being used. The key is to use the light at the right time of day and for the right amount of time. This is based upon the sleep disorder you want to correct.     New models are also safer, protecting you from harmful UV rays. Some models are now focusing on a specific bandwidth of light. Light boxes can be purchased in a variety of makes and models. Some are now being made much smaller so they are easier to take with you. General prices range from $200 - $500 per light box.   2. Desk Lamp   This serves the same purpose as a light box, but it is made to look like a normal lamp. It blends in better when used in an office setting.   3. Light Visor   This is a light source that is worn on your head and hangs over your eyes. It looks much like a tennis visor. It is made so that you can move around during sessions. The strength of visor lights also varies from 3,000 to 10,000 lux.   4. Sierra Simulator     These lights gradually make a dark room brighter over a set period of time. This is meant to mimic the sunrise. Some people may find that this helps them wake up in the morning. Models may also slowly dim to copy a sunset.          Who gets it?  Bright light therapy is used for people who suffer from circadian rhythm disorders. The time of day when the light is used will depend upon the disorder it is meant to correct. These disorders include the followin. Delayed sleep phase disorder   This causes people to fall asleep much later at night than is normal. As a result, they also wake up later in the morning. This sleep pattern can interfere with their schedule of activities for the day. To correct delayed sleep phase, light treatment takes place during the early morning hours.   2. Advanced sleep phase disorder   This causes people to fall  asleep much earlier at night than is normal. They also wake up earlier in the morning. To correct it, light treatment takes place early at night.   3. Free-running or Non-24-hour sleep-wake rhythm   People with this disorder fall asleep at a different time each day. For example, you may fall asleep at 10 p.m. one day, Midnight the next day, 2 a.m. the next, etc. This most often occurs in people who are blind. Light therapy may help blind people, even if they can't perceive visible light. Studies show that light treatment may be useful in the early morning hours.   4. Jet lag   Jet lag causes people to have problems with sleep when they have crossed many time zones on a flight. Light therapy in the morning may help when traveling east. For travel to the west, bright light in the evening may help reduce jet lag.   5. Shift Work   This sleep disorder occurs due to a work schedule, such as night shift, that takes place during the time when most people are sleeping. This schedule requires you to work when your body wants to sleep. Then you have to try to sleep when your body expects to be awake. Correcting it can be a hard problem to solve. Changing work schedules, days off, and social activities can alter your exposure to light from day to day. Frequent changes in your sleep times make it hard to re-set your internal clock. In general, using light treatment in the evening should help someone who regularly works nights. In this case, you would also want to avoid daylight when you come off work and go to bed. Dark sunglasses or special goggles can help.   6. Seasonal affective disorder (SAD)   SAD is a mood disorder that can cause people to feel sad and lack energy during the dark months of winter. A similar and milder version is often called the  Winter Blues.  In severe forms, sadness may be caused by depression. Light therapy is thought to be useful as one of the treatments for seasonal mood disorders and depression.  Depression is also treated with medications. You should consult your doctor if you are having serious problems with sadness.         Possible side effects?  Light therapy has a good record of safety. It does not seem to produce any major side effects. Light therapy should always be used within the proper limits for intensity and time. Minor side effects may include the following:   Eye irritation and dryness   Headache   Nausea   Dryness of skin   To reduce these side effects, begin the light therapy very slowly. Give your body time to get used to it. The use of a humidifier can also help with irritations caused by dryness. Talk to your doctor or a sleep specialist before beginning use.       Yes. It is easy to confuse DSP with normal variations of sleep and other types of insomnia. Consulting with a sleep specialist is your best bet to help clarify current sleep problems. He or she will also be able to help you develop a plan to correct these problems. A specialist can assess the factors that cause and make this problem worse. These include both social and behavioral factors.     What will the doctor need to know?   The sleep doctor will do a thorough physical exam. He or she will also discuss with you the history of these sleep problems. It would be helpful to keep a sleep diary prior to seeing a sleep doctor. Bring this information with you to the appointment. A sleep diary is a systematic way to track your sleep pattern. You record the time you get into bed, the time required to fall asleep, and the time you wake up in the morning. Sleep diaries often show a regular pattern of difficulty falling to sleep. They often show few or no awakenings once asleep. They also tend to show a sleep duration that is reduced during the work week and lengthy on the weekend.     Will I need to take any tests?   An overnight sleep study is not normally needed for someone who suffers from DSP. Your doctor may have you do an  overnight sleep study if your problem is severely disturbing your sleep. This study is called a polysomnogram. It charts your brain waves, heart rate, and breathing as you sleep. It also records how your arms and legs move. This study will help determine if there are any objective sleep disorders related to your sleep problem.     How is it treated?   The most accepted treatment for DSP is what is called chronotherapy. This is a method of behavioral treatment. Your bedtime is delayed by about three hours per day for five or six consecutive days. Once the desired bedtime is reached the schedule is frozen. This schedule needs to be maintained rigidly at this point.  Bright light therapy is another proven technique for changing one s internal circadian rhythms. But its specific use for DSP has not been well validated. In theory, exposure to bright light should occur shortly after waking up at the desired time in the morning. Then bright outdoor light in the evening hours should be avoided.

## 2025-01-17 ENCOUNTER — TELEPHONE (OUTPATIENT)
Dept: SLEEP MEDICINE | Facility: CLINIC | Age: 55
End: 2025-01-17

## 2025-01-17 NOTE — TELEPHONE ENCOUNTER
General Call      Reason for Call:  sleep study     What are your questions or concerns:  Billy went to get parts for his CPAP and was told by the insurance that they require a sleep study every five years. Last sleep study was done 15 years ago. Billy is asking to schedule a sleep study and will require orders to schedule.     Date of last appointment with provider: 12/31/25    Okay to leave a detailed message?: Yes at Cell number on file:    Telephone Information:   Mobile [phone]

## 2025-01-27 NOTE — PROGRESS NOTES
Medication Therapy Management (MTM) Encounter    ASSESSMENT:                            Medication Adherence/Access: No issues identified.    Type 2 Diabetes:   Patient is meeting A1c goal of < 7%. Self monitoring of blood glucose is at goal of >70% in range.  Recommended continuing Mounjaro 15 mg weekly for blood sugar control and weight loss.   Discussed if he is wanting to get off un-needed medications could consider stopping metformin with how good his A1c has been. Patient reports he still has a couple months supply so will continue for now.     Hypertension:   Patient is almost meeting blood pressure goal of < 130/80mmHg. Discussed it is the hydrochlorothiazide component that is causing frequent urination. Patient denied any history of swelling and last BMP in Oct had potassium of 3.9, so will try stopping lisinopril-hydrochlorothiazide and instead just use lisinopril 40 mg daily. Will have ur next visit in person to repeat blood pressure and check BMP.     Hyperlipidemia:   Stable.  Patient is on high intensity statin which is indicated based on 2019 ACC/AHA guidelines for lipid management.       PLAN:                            Stop lisinopril-hydrochlorothiazide combination and instead try just lisinopril 40 mg daily to control your blood pressure while reducing increased urination frequency.    Follow-up: Return in about 8 weeks (around 3/25/2025) for Medication Therapy Management, In-Clinic Visit.    SUBJECTIVE/OBJECTIVE:                          Billy Chavez is a 54 year old male seen for a follow-up visit.       Reason for visit: med review, diabetes.    Allergies/ADRs: Reviewed in chart  Past Medical History: Reviewed in chart  Tobacco: He reports that he has quit smoking. His smoking use included cigarettes. He has never used smokeless tobacco.  Alcohol: not currently using    Medication Adherence/Access: no issues reported.    Diabetes   Type 2 Diabetes:    Mounjaro 15 mg weekly   Metformin   mg daily  Reports he has somewhat reached a steady state though with his weight, trying to push past this to at least get under 200 lbs.  Last home weight: 219 lb  Goal weight: 175 lb  Is meeting with a nutritionist regularly to help with diet. Has been eating more fish and fiber than previously.  Blood sugar monitoring: CGM; Ranges: (patient reported) His phone jamie wasn't working, so has just been using the dexcom reader which has worked well. Reports they have still been good and rarely out of range. Did hit 10 last night but it corrected on its own.  Current diabetes symptoms: none  Diet/Exercise: Patient reports that he has been having difficulty exercising as much with colder weather. Hoping to get back into biking as it warms up.            Hypertension   Amlodipine 5 mg daily  Lisinopril-hydrochlorothiazide 20-12.5 mg daily  Patient reports he has started getting annoyed by his increased frequency of urination, wondering if anything can be done to help this.  Patient does not self-monitor blood pressure.        BP Readings from Last 3 Encounters:   12/31/24 123/84   05/20/24 128/82   03/12/24 130/82       Hyperlipidemia   rosuvastatin 20 mg daily  Patient reports no significant myalgias or other side effects.                Today's Vitals: There were no vitals taken for this visit.  ----------------      I spent 20 minutes with this patient today. All changes were made via collaborative practice agreement with Min Tong MD.     A summary of these recommendations was declined by the patient.    Zoila Soares, PharmD  Medication Therapy Management Pharmacist  Voicemail: [phone]      Telemedicine Visit Details  The patient's medications can be safely assessed via a telemedicine encounter.  Type of service:  Telephone visit  Originating Location (pt. Location): Home    Distant Location (provider location):  On-site  Start Time:  8:30 AM  End Time: 8:50 AM     Medication Therapy Recommendations  HTN  (hypertension), benign   1 Current Medication: lisinopril 10 MG TABS 20 mg, hydrochlorothiazide 12.5 MG CAPS 12.5 mg (Discontinued)   Current Medication Sig: Take by mouth daily   Rationale: Undesirable effect - Adverse medication event - Safety   Recommendation: Change Medication   Status: Accepted per CPA   Identified Date: 1/28/2025 Completed Date: 1/28/2025

## 2025-01-28 ENCOUNTER — VIRTUAL VISIT (OUTPATIENT)
Dept: PHARMACY | Facility: PHYSICIAN GROUP | Age: 55
End: 2025-01-28
Payer: COMMERCIAL

## 2025-01-28 DIAGNOSIS — E78.2 MIXED HYPERLIPIDEMIA: ICD-10-CM

## 2025-01-28 DIAGNOSIS — I10 HTN (HYPERTENSION), BENIGN: ICD-10-CM

## 2025-01-28 DIAGNOSIS — E11.9 TYPE 2 DIABETES MELLITUS WITHOUT COMPLICATION, WITHOUT LONG-TERM CURRENT USE OF INSULIN (H): Primary | ICD-10-CM

## 2025-01-28 PROCEDURE — 99605 MTMS BY PHARM NP 15 MIN: CPT | Mod: 93 | Performed by: PHARMACIST

## 2025-01-28 PROCEDURE — 99607 MTMS BY PHARM ADDL 15 MIN: CPT | Mod: 93 | Performed by: PHARMACIST

## 2025-01-28 RX ORDER — MULTIVIT-MIN/IRON/FOLIC ACID/K 18-600-40
1 CAPSULE ORAL DAILY
COMMUNITY

## 2025-01-28 RX ORDER — LISINOPRIL 40 MG/1
40 TABLET ORAL DAILY
COMMUNITY

## 2025-01-28 NOTE — PATIENT INSTRUCTIONS
"Recommendations from today's MTM visit:                                                       Stop lisinopril-hydrochlorothiazide combination and instead try just lisinopril 40 mg daily to control your blood pressure while reducing increased urination frequency.    Follow-up: Return in about 8 weeks (around 3/25/2025) for Medication Therapy Management, In-Clinic Visit.    It was great speaking with you today.  I value your experience and would be very thankful for your time in providing feedback in our clinic survey. In the next few days, you may receive an email or text message from [employer] with a link to a survey related to your  clinical pharmacist.\"     To schedule another MTM appointment, please call the clinic directly or you may call the MTM scheduling line at [phone].    My Clinical Pharmacist's contact information:                                                      Please feel free to contact me with any questions or concerns you have.      Zoila Soares, PharmD  Medication Therapy Management Pharmacist  Voicemail: [phone]     "

## 2025-03-24 NOTE — PROGRESS NOTES
Medication Therapy Management (MTM) Encounter    ASSESSMENT:                            Medication Adherence/Access: No issues identified.    Type 2 Diabetes:   Patient is meeting A1c goal of < 7%. Self monitoring of blood glucose is at goal of >70% in range.  Recommended continuing Mounjaro 15 mg weekly for blood sugar control and weight loss.   Recommended stopping metformin with how good blood sugars, and actually having occasional lows on this regimen.  Recommend repeat A1c today, prior to visit with PCP.     Hypertension:   Patient is almost meeting blood pressure goal of < 130/80mmHg.   Discussed if blood pressure is still high at visit with Dr. Tong then would consider increasing amlodipine to 10 mg daily.  Recommend repeat BMP today since increasing lisinopril.     Hyperlipidemia:   Stable.  Patient is on high intensity statin which is indicated based on 2019 ACC/AHA guidelines for lipid management.     PLAN:                            Stop the metformin and see how your blood sugars this.  Repeat BMP (basic metabolic panel) and A1c.  If blood pressure stays high at future visit we would recommend increasing amlodipine to 10 mg daily    Follow-up: Return in about 12 weeks (around 6/17/2025) for Medication Therapy Management, In-Clinic Visit.    SUBJECTIVE/OBJECTIVE:                          Billy Chavez is a 54 year old male seen for a follow-up visit.       Reason for visit: med review.    Allergies/ADRs: Reviewed in chart  Past Medical History: Reviewed in chart  Tobacco: He reports that he has quit smoking. His smoking use included cigarettes. He has never used smokeless tobacco.  Alcohol: not currently using    Medication Adherence/Access: no issues reported.    Diabetes   Type 2 Diabetes:    Mounjaro 15 mg weekly   Metformin  mg daily  No concerns with side effects.  Reports weight was a little higher than he was hoping today. Still hoping to get under 200 lbs.  Goal weight: 175 lb  Is meeting  with a nutritionist regularly to help with diet. Has been eating more fish and fiber than previously. Trying to up protein intake too.  Blood sugar monitoring: CGM; Ranges: (patient reported) His phone jamie wasn't working, so has just been using the dexcom reader which has worked well.   GMI: 6.1%  Time in target for past 14 days:  >180 mg/dL: 1%   mg/dL: 98%  <70 mg/dL: 1%  Current diabetes symptoms: none  Diet/Exercise: Patient reports that he has been having difficulty exercising as much with colder weather. Hoping to get back into biking as it warms up.                Hypertension   Amlodipine 5 mg daily  Lisinopril 40 mg daily  Patient is happy to be off hydrochlorothiazide so doesn't have increased frequency of urination. No concerns with swelling since stopping.  Patient does not self-monitor blood pressure.        BP Readings from Last 3 Encounters:   03/25/25 (!) 144/82   12/31/24 123/84   05/20/24 128/82       Hyperlipidemia   rosuvastatin 20 mg daily  Patient reports no significant myalgias or other side effects.                Today's Vitals: BP (!) 144/82   Pulse 83   Wt 236 lb 2.1 oz (107.1 kg)   BMI 33.88 kg/m    ----------------      I spent 30 minutes with this patient today. All changes were made via collaborative practice agreement with Min Tong MD.     A summary of these recommendations was given to the patient.    Zoila Soares, PharmD  Medication Therapy Management Pharmacist  Voicemail: [phone]           Medication Therapy Recommendations  Type 2 diabetes mellitus without complication, without long-term current use of insulin (H)   1 Current Medication: metFORMIN (GLUCOPHAGE XR) 500 MG 24 hr tablet (Discontinued)   Current Medication Sig: Take 500 mg by mouth daily (with dinner)   Rationale: Nonmedication therapy more appropriate - Unnecessary medication therapy - Indication   Recommendation: Discontinue Medication   Status: Accepted per CPA   Identified Date: 3/25/2025  Completed Date: 3/25/2025

## 2025-03-25 ENCOUNTER — TRANSFERRED RECORDS (OUTPATIENT)
Dept: HEALTH INFORMATION MANAGEMENT | Facility: CLINIC | Age: 55
End: 2025-03-25

## 2025-03-25 ENCOUNTER — OFFICE VISIT (OUTPATIENT)
Dept: PHARMACY | Facility: PHYSICIAN GROUP | Age: 55
End: 2025-03-25
Payer: COMMERCIAL

## 2025-03-25 VITALS
SYSTOLIC BLOOD PRESSURE: 144 MMHG | BODY MASS INDEX: 33.88 KG/M2 | WEIGHT: 236.13 LBS | DIASTOLIC BLOOD PRESSURE: 82 MMHG | HEART RATE: 83 BPM

## 2025-03-25 DIAGNOSIS — I10 HTN (HYPERTENSION), BENIGN: ICD-10-CM

## 2025-03-25 DIAGNOSIS — E11.9 TYPE 2 DIABETES MELLITUS WITHOUT COMPLICATION, WITHOUT LONG-TERM CURRENT USE OF INSULIN (H): Primary | ICD-10-CM

## 2025-03-25 DIAGNOSIS — E78.2 MIXED HYPERLIPIDEMIA: ICD-10-CM

## 2025-03-25 LAB — HBA1C MFR BLD: 5.3 % (ref 4.8–5.6)

## 2025-03-25 PROCEDURE — 99607 MTMS BY PHARM ADDL 15 MIN: CPT | Performed by: PHARMACIST

## 2025-03-25 PROCEDURE — 99606 MTMS BY PHARM EST 15 MIN: CPT | Performed by: PHARMACIST

## 2025-03-25 NOTE — PATIENT INSTRUCTIONS
"Recommendations from today's MTM visit:                                                       Stop the metformin and see how your blood sugars this.  Repeat BMP (basic metabolic panel) and A1c.  If blood pressure stays high at future visit we would recommend increasing amlodipine to 10 mg daily    Follow-up: Return in about 12 weeks (around 6/17/2025) for Medication Therapy Management, In-Clinic Visit.    It was great speaking with you today.  I value your experience and would be very thankful for your time in providing feedback in our clinic survey. In the next few days, you may receive an email or text message from [email or web address] with a link to a survey related to your  clinical pharmacist.\"     To schedule another MTM appointment, please call the clinic directly or you may call the MTM scheduling line at [phone].    My Clinical Pharmacist's contact information:                                                      Please feel free to contact me with any questions or concerns you have.      Zoila Soares, PharmD  Medication Therapy Management Pharmacist  Voicemail: [phone]     "

## 2025-04-08 ENCOUNTER — OFFICE VISIT (OUTPATIENT)
Dept: SLEEP MEDICINE | Facility: CLINIC | Age: 55
End: 2025-04-08
Attending: INTERNAL MEDICINE
Payer: COMMERCIAL

## 2025-04-08 DIAGNOSIS — G47.33 OSA (OBSTRUCTIVE SLEEP APNEA): ICD-10-CM

## 2025-04-08 NOTE — PROGRESS NOTES
Pt is completing a home sleep test. Pt was instructed on how to put on the Noxturnal T3 device and associated equipment before going to bed and given the opportunity to practice putting it on before leaving the sleep center. Pt was reminded to bring the home sleep test kit back to the center tomorrow, at the scheduled time for download and reporting. Patient was instructed to complete study using the following treatment?  None  Device number: 30  Yanna Quintana CMA on 4/8/2025 at 11:35 AM

## 2025-04-09 ENCOUNTER — DOCUMENTATION ONLY (OUTPATIENT)
Dept: SLEEP MEDICINE | Facility: CLINIC | Age: 55
End: 2025-04-09
Attending: INTERNAL MEDICINE
Payer: COMMERCIAL

## 2025-04-09 NOTE — PROGRESS NOTES
Pt returned HST device. It was downloaded and forwarded data to the clinical specialist for scoring. Yanna Quintana CMA on 4/9/2025 at 8:53 AM

## 2025-05-09 NOTE — PATIENT INSTRUCTIONS
"Recommendations from today's MTM visit:                                                       Reduce metformin XR to 1000 mg daily  Finish up the Mounjaro 12.5 mg/week dose, then go back to the Mounjaro 15 mg/week as planned.    Follow-up: Return in about 8 weeks (around 7/23/2024) for Medication Therapy Management, Phone Visit.    It was great speaking with you today.  I value your experience and would be very thankful for your time in providing feedback in our clinic survey. In the next few days, you may receive an email or text message from [employer] with a link to a survey related to your  clinical pharmacist.\"     To schedule another MTM appointment, please call the clinic directly or you may call the MTM scheduling line at [phone].    My Clinical Pharmacist's contact information:                                                      Please feel free to contact me with any questions or concerns you have.      Zoila Soares, PharmD  Medication Therapy Management Pharmacist  Voicemail: [phone]     "
Negative

## 2025-06-16 NOTE — PROGRESS NOTES
Medication Therapy Management (MTM) Encounter    ASSESSMENT:                            Medication Adherence/Access: No issues identified.    Type 2 Diabetes:   Patient is meeting A1c goal of < 7%. Self monitoring of blood glucose is at goal of >70% in range.  Recommended continuing Mounjaro 15 mg weekly for blood sugar control and weight loss.      Hypertension:   Patient is almost meeting blood pressure goal of < 130/80mmHg.   Recommend increasing amlodipine to 10 mg daily due to continued blood pressures above goal at his last several clinic visits. Discussed we can always reduce it back down if needed as he loses weight.     Hyperlipidemia:   Stable.  Patient is on high intensity statin which is indicated based on 2019 ACC/AHA guidelines for lipid management.     PLAN:                            Increase the amlodipine to 10 mg a day to help with blood pressure.      Follow-up: Return in about 13 weeks (around 9/16/2025) for Medication Therapy Management, In-Clinic Visit at 9AM.    SUBJECTIVE/OBJECTIVE:                          Billy Chavez is a 55 year old male seen for a follow-up visit.       Reason for visit: med review.    Allergies/ADRs: Reviewed in chart  Past Medical History: Reviewed in chart  Tobacco: He reports that he has quit smoking. His smoking use included cigarettes. He has never used smokeless tobacco.  Alcohol: not currently using    Medication Adherence/Access: no issues reported.    Diabetes   Type 2 Diabetes:    Mounjaro 15 mg weekly   No concerns with side effects.  Reports weight was a little higher than he was hoping today. Still hoping to get under 200 lbs.  Goal weight: 175 lb  Is meeting with a nutritionist regularly to help with diet.   Blood sugar monitoring: CGM; Ranges: (patient reported) His phone jamie wasn't working, so has just been using the dexcom reader which has worked well - see below  Current diabetes symptoms: none  Diet/Exercise: Patient reports he is working on  improving diet, only complex carbs if he is going to have carbs. Is working on increasing his biking as we head into the summer. Last bike ride was 28 miles. Exploring all new bike paths around town.  Reports he has been sick the last week, so has been a little less active.                Wt Readings from Last 5 Encounters:   06/17/25 239 lb 8 oz (108.6 kg)   03/25/25 236 lb 2.1 oz (107.1 kg)   12/31/24 228 lb 3.2 oz (103.5 kg)   05/20/24 238 lb 8 oz (108.2 kg)   03/12/24 236 lb (107 kg)         Hypertension   Amlodipine 5 mg daily  Lisinopril 40 mg daily  Patient is happy to be off hydrochlorothiazide so doesn't have increased frequency of urination. No concerns with swelling.  Reports he does feel like sometimes he can feel that his blood pressure will be high, doesn't know if that is possible.  Patient does not self-monitor blood pressure.        BP Readings from Last 3 Encounters:   06/17/25 132/80   03/25/25 (!) 144/82   12/31/24 123/84       Hyperlipidemia   rosuvastatin 20 mg daily  Patient reports no significant myalgias or other side effects.                  Today's Vitals: /80   Pulse 78   Wt 239 lb 8 oz (108.6 kg)   BMI 34.36 kg/m    ----------------      I spent 20 minutes with this patient today. All changes were made via collaborative practice agreement with Min Tong MD.     A summary of these recommendations was given to the patient.    Zoila Soares, PharmD  Medication Therapy Management Pharmacist  Voicemail: [phone]           Medication Therapy Recommendations  Hypertension   1 Current Medication: amLODIPine (NORVASC) 5 MG tablet (Discontinued)   Current Medication Sig: Take 5 mg by mouth daily   Rationale: Dose too low - Dosage too low - Effectiveness   Recommendation: Increase Dose   Status: Accepted per CPA   Identified Date: 6/17/2025 Completed Date: 6/17/2025

## 2025-06-17 ENCOUNTER — OFFICE VISIT (OUTPATIENT)
Dept: PHARMACY | Facility: PHYSICIAN GROUP | Age: 55
End: 2025-06-17
Payer: COMMERCIAL

## 2025-06-17 VITALS
HEART RATE: 78 BPM | WEIGHT: 239.5 LBS | BODY MASS INDEX: 34.36 KG/M2 | SYSTOLIC BLOOD PRESSURE: 132 MMHG | DIASTOLIC BLOOD PRESSURE: 80 MMHG

## 2025-06-17 DIAGNOSIS — E78.2 MIXED HYPERLIPIDEMIA: ICD-10-CM

## 2025-06-17 DIAGNOSIS — I10 HTN (HYPERTENSION), BENIGN: ICD-10-CM

## 2025-06-17 DIAGNOSIS — E11.9 TYPE 2 DIABETES MELLITUS WITHOUT COMPLICATION, WITHOUT LONG-TERM CURRENT USE OF INSULIN (H): Primary | ICD-10-CM

## 2025-06-17 PROCEDURE — 99606 MTMS BY PHARM EST 15 MIN: CPT | Performed by: PHARMACIST

## 2025-06-17 PROCEDURE — 99607 MTMS BY PHARM ADDL 15 MIN: CPT | Performed by: PHARMACIST

## 2025-06-17 RX ORDER — AMLODIPINE BESYLATE 10 MG/1
10 TABLET ORAL DAILY
COMMUNITY

## 2025-06-24 ENCOUNTER — VIRTUAL VISIT (OUTPATIENT)
Dept: SLEEP MEDICINE | Facility: CLINIC | Age: 55
End: 2025-06-24
Payer: COMMERCIAL

## 2025-06-24 VITALS — WEIGHT: 239 LBS | SYSTOLIC BLOOD PRESSURE: 132 MMHG | BODY MASS INDEX: 34.29 KG/M2 | DIASTOLIC BLOOD PRESSURE: 80 MMHG

## 2025-06-24 DIAGNOSIS — G47.33 OSA ON CPAP: Primary | ICD-10-CM

## 2025-06-24 DIAGNOSIS — Z72.821 INADEQUATE SLEEP HYGIENE: ICD-10-CM

## 2025-06-24 PROCEDURE — 98006 SYNCH AUDIO-VIDEO EST MOD 30: CPT | Performed by: INTERNAL MEDICINE

## 2025-06-24 ASSESSMENT — SLEEP AND FATIGUE QUESTIONNAIRES
HOW LIKELY ARE YOU TO NOD OFF OR FALL ASLEEP WHILE SITTING AND READING: MODERATE CHANCE OF DOZING
HOW LIKELY ARE YOU TO NOD OFF OR FALL ASLEEP WHILE SITTING AND TALKING TO SOMEONE: WOULD NEVER DOZE
HOW LIKELY ARE YOU TO NOD OFF OR FALL ASLEEP WHILE WATCHING TV: SLIGHT CHANCE OF DOZING
HOW LIKELY ARE YOU TO NOD OFF OR FALL ASLEEP WHEN YOU ARE A PASSENGER IN A CAR FOR AN HOUR WITHOUT A BREAK: SLIGHT CHANCE OF DOZING
HOW LIKELY ARE YOU TO NOD OFF OR FALL ASLEEP WHILE SITTING INACTIVE IN A PUBLIC PLACE: SLIGHT CHANCE OF DOZING
HOW LIKELY ARE YOU TO NOD OFF OR FALL ASLEEP WHILE SITTING QUIETLY AFTER LUNCH WITHOUT ALCOHOL: SLIGHT CHANCE OF DOZING
HOW LIKELY ARE YOU TO NOD OFF OR FALL ASLEEP WHILE LYING DOWN TO REST IN THE AFTERNOON WHEN CIRCUMSTANCES PERMIT: MODERATE CHANCE OF DOZING
HOW LIKELY ARE YOU TO NOD OFF OR FALL ASLEEP IN A CAR, WHILE STOPPED FOR A FEW MINUTES IN TRAFFIC: WOULD NEVER DOZE

## 2025-06-24 ASSESSMENT — PAIN SCALES - GENERAL: PAINLEVEL_OUTOF10: NO PAIN (0)

## 2025-06-24 NOTE — PROGRESS NOTES
Virtual Visit Details    Type of service:  Video Visit     Originating Location (pt. Location): Home    Distant Location (provider location):  On-site  Platform used for Video Visit: [employer]ahomy[employer]reHEART           Assessment and Plan:   Billy Chavez is a 54 year old male with history of hypertension, type 2 diabetes diabetes mellitus, history of smoking, class II obesity was initially diagnosed with severe obstructive sleep apnea in 2008 and has been on CPAP therapy, underwent a repeat sleep diagnostic workup to continue ongoing PAP therapy.      Sleep disordered breathing.  History of severe obstructive sleep apnea diagnosed with a type I polysomnogram in May 2008 with a combined apnea hypopnea index of 41 events per hour has been on effective CPAP therapy since then. He requested for PAP device replacement along with PAP supplies for which he underwent a diagnostic re-evaluation of his sleep disordered breathing.  NOx T3 Home sleep apnea test reconfirmed the presence of moderate obstructive sleep apnea with a combined apnea-hypopnea index of 27 events per hour with significant positional variability with a supine AHI of 39 events per hour.    Circadian rhythm-delayed sleep phase disorder.  There is interval success in sleep phase advancement along with interval increase in cumulative sleep.  Bedtime and consistency persist particularly over the weekend which the patient intends to correct.  Insufficient sleep.  Interval improvement age-related sleep during the week.  Now averaging around 7 to 7-1/2 hours of sleep.           Comorbid Diagnoses:     Type 2 diabetes mellitus.  Hypertension.    History of tobacco use.  Class II obesity.     Summary Recommendations:     Prescription for PAP device replacement and PAP supplies was provided.  He strongly advised to maintain a consistent bed and wake time.  He should sleep no later than 11 PM with a 7 AM awakening during his workweek while over the weekend which are sleep around  11 PM with a wake time between 8 AM to 9 AM.  Sleep hygiene with focus on maintaining a consistent bed and wake time was discussed and recommended.    Summary Counseling:    Check out [email or web address]           History of Present Illness:     Billy Chavez is a 55 year old male with above-mentioned medical history underwent reevaluation for sleep disordered breathing presence of moderate to severe obstructive sleep apnea, which reconfirmed.  He is currently using a Respironics device which was provided after his initial device recall.  He has struggled with getting PAP supplies and is interested in replacing his device to a ResMed.  Reports of interval improvement in total sleep sleep duration particularly during the workweek although some inconsistency in his bedtime remains.          PREVIOUS SLEEP STUDIES: Type I polysomnogram, split-night protocol  [facility]     Date: 5/13/2008  AHI: 41/h  Intervention: CPAP 7 cm of water  Sleep Architecture: Total sleep time 408.5 minutes, sleep latency 4 minutes, REM sleep latency 102.5 minutes, sleep efficiency 79.3%    Date: 04/08/2025  Type: HSAT/NOxT3  AHI: 27/h, supine AHI 39/h    Sleep wake schedule:   Workday bedtime 11:30 PM - 12 AM Awakening 6:50 AM using alarm   Nonworkday bedtime 12 AM Awakening 8:00 AM        Overall, he rates the experience with PAP as 8 (0 poor, 10 great). The mask is not comfortable.  The mask is not leaking.  He is not snoring with the mask on. He is not having gasp arousals.  He is not having significant oral/nasal dryness. The pressure is not comfortable.     His PAP interface is Full Face Mask.      He does  feel rested in the morning.    [place] Sleepiness Scale: 8/24      DIOR Total Score: (Patient-Rptd) 10           Medications:     Current Outpatient Medications   Medication Sig Dispense Refill    amLODIPine (NORVASC) 10 MG tablet Take 10 mg by mouth daily.      Ascorbic Acid  (VITAMIN C) 500 MG CAPS Take 1 tablet by mouth daily.      lisinopril (ZESTRIL) 40 MG tablet Take 40 mg by mouth daily.      rosuvastatin (CRESTOR) 20 MG tablet Take 20 mg by mouth daily      sildenafil (REVATIO) 20 MG tablet Take 20 mg by mouth 3 times daily      tirzepatide (MOUNJARO) 15 MG/0.5ML pen Inject 15 mg Subcutaneous every 7 days      vitamin D3 (CHOLECALCIFEROL) 50 mcg (2000 units) tablet Take 1 tablet by mouth daily       No current facility-administered medications for this visit.        No Known Allergies         Past Medical History:     Does not need 02 supplement at night   No past medical history on file.          Past Surgical History:    No h/o  upper airway surgery  No past surgical history on file.                   Physical Examination:     Vitals:  No vitals were obtained today due to virtual visit.    Physical Exam   EYES: Eyes grossly normal to inspection.  No discharge or erythema, or obvious scleral/conjunctival abnormalities.  SKIN: Visible skin clear. No significant rash, abnormal pigmentation or lesions.  NEURO: Cranial nerves grossly intact.  Mentation and speech appropriate for age.  GENERAL: Healthy, alert and no distress  RESP: No audible wheeze, cough, or visible cyanosis.  No visible retractions or increased work of breathing.    PSYCH: Mentation appears normal, affect normal/bright, judgement and insight intact, normal speech and appearance well-groomed.    Copy to: Min Tong    Total of 30 minutes of time was spent with patient, this included the interview and exam, and review of the chart/labs/imaging/sleep study/PAP therapy data on 06/24/2025 . Greater than 50% of which was spent counseling and coordinating care.    Vick Hurtado MD     [facility]   Floor 1, [address]   [address] [place] [address]   Appointments: [phone]    [facility]  3rd Floor  [address], [place] [address]

## 2025-06-24 NOTE — NURSING NOTE
Current patient location: [address] [facility] [address]    Is the patient currently in the state of [place]? YES    Visit mode: VIDEO    If the visit is dropped, the patient can be reconnected by:VIDEO VISIT: Text to cell phone:   Telephone Information:   Mobile [phone]       Will anyone else be joining the visit? NO  (If patient encounters technical issues they should call [phone]    Are changes needed to the allergy or medication list? No    Are refills needed on medications prescribed by this physician? NO    Rooming Documentation:  Not applicable    Reason for visit: RECHVALDEMAR GANF

## 2025-07-06 ENCOUNTER — HEALTH MAINTENANCE LETTER (OUTPATIENT)
Age: 55
End: 2025-07-06